# Patient Record
Sex: MALE | Race: WHITE
[De-identification: names, ages, dates, MRNs, and addresses within clinical notes are randomized per-mention and may not be internally consistent; named-entity substitution may affect disease eponyms.]

---

## 2018-05-25 ENCOUNTER — HOSPITAL ENCOUNTER (INPATIENT)
Dept: HOSPITAL 53 - M ED INP | Age: 83
LOS: 1 days | Discharge: HOME | DRG: 389 | End: 2018-05-26
Attending: INTERNAL MEDICINE | Admitting: INTERNAL MEDICINE
Payer: MEDICARE

## 2018-05-25 DIAGNOSIS — Z79.01: ICD-10-CM

## 2018-05-25 DIAGNOSIS — E87.2: ICD-10-CM

## 2018-05-25 DIAGNOSIS — E78.5: ICD-10-CM

## 2018-05-25 DIAGNOSIS — Z85.118: ICD-10-CM

## 2018-05-25 DIAGNOSIS — K56.2: Primary | ICD-10-CM

## 2018-05-25 DIAGNOSIS — I50.9: ICD-10-CM

## 2018-05-25 DIAGNOSIS — J44.9: ICD-10-CM

## 2018-05-25 DIAGNOSIS — I48.2: ICD-10-CM

## 2018-05-25 DIAGNOSIS — R91.1: ICD-10-CM

## 2018-05-25 DIAGNOSIS — D64.9: ICD-10-CM

## 2018-05-25 DIAGNOSIS — I25.10: ICD-10-CM

## 2018-05-25 DIAGNOSIS — I11.0: ICD-10-CM

## 2018-05-25 DIAGNOSIS — Z87.890: ICD-10-CM

## 2018-05-25 DIAGNOSIS — Z79.899: ICD-10-CM

## 2018-05-25 DIAGNOSIS — Z95.810: ICD-10-CM

## 2018-05-25 DIAGNOSIS — Z79.82: ICD-10-CM

## 2018-05-25 LAB
ALBUMIN/GLOBULIN RATIO: 1.08 (ref 1–1.93)
ALBUMIN: 4.2 GM/DL (ref 3.2–5.2)
ALKALINE PHOSPHATASE: 139 U/L (ref 45–117)
ALT SERPL W P-5'-P-CCNC: 32 U/L (ref 12–78)
ANION GAP: 11 MEQ/L (ref 8–16)
AST SERPL-CCNC: 27 U/L (ref 7–37)
BASO #: 0 10^3/UL (ref 0–0.2)
BASO %: 0.3 % (ref 0–1)
BILIRUB CONJ SERPL-MCNC: 0.5 MG/DL (ref 0–0.2)
BILIRUBIN,TOTAL: 1.3 MG/DL (ref 0.2–1)
BLOOD UREA NITROGEN: 26 MG/DL (ref 7–18)
CALCIUM LEVEL: 9.4 MG/DL (ref 8.8–10.2)
CARBON DIOXIDE LEVEL: 25 MEQ/L (ref 21–32)
CHLORIDE LEVEL: 103 MEQ/L (ref 98–107)
CK MB CFR.DF SERPL CALC: 3.8
CK SERPL-CCNC: 50 U/L (ref 39–308)
CK-MB VALUE MASS: 1.9 NG/ML (ref ?–3.6)
CREATININE FOR GFR: 0.97 MG/DL (ref 0.7–1.3)
EOS #: 0 10^3/UL (ref 0–0.5)
EOSINOPHIL NFR BLD AUTO: 0.3 % (ref 0–3)
FERRITIN: 289 NG/ML (ref 26–388)
FREE T4: 1.27 NG/DL (ref 0.76–1.46)
GFR SERPL CREATININE-BSD FRML MDRD: > 60 ML/MIN/{1.73_M2} (ref 35–?)
GLUCOSE, FASTING: 145 MG/DL (ref 70–100)
HEMATOCRIT: 37.4 % (ref 42–52)
HEMOGLOBIN: 12.5 G/DL (ref 13.5–17.5)
IMMATURE GRANULOCYTE %: 0.5 % (ref 0–3)
INR: 1.53
IRON (FE): 94 UG/DL (ref 65–175)
IRON SATN MFR SERPL: 33.3 % (ref 19.7–50)
LACTIC ACID SEPSIS PROTOCOL: 2.3 MMOL/L (ref 0.4–2)
LIPASE: 103 U/L (ref 73–393)
LYMPH #: 1.3 10^3/UL (ref 1.5–4.5)
LYMPH %: 14.4 % (ref 24–44)
MAGNESIUM LEVEL: 1.9 MG/DL (ref 1.8–2.4)
MEAN CORPUSCULAR HEMOGLOBIN: 34.2 PG (ref 27–33)
MEAN CORPUSCULAR HGB CONC: 33.4 G/DL (ref 32–36.5)
MEAN CORPUSCULAR VOLUME: 102.5 FL (ref 80–96)
MONO #: 0.7 10^3/UL (ref 0–0.8)
MONO %: 7.7 % (ref 0–5)
NEUTROPHILS #: 6.8 10^3/UL (ref 1.8–7.7)
NEUTROPHILS %: 76.8 % (ref 36–66)
NRBC BLD AUTO-RTO: 0 % (ref 0–0)
NT-PRO BNP: 5837 PG/ML (ref ?–450)
PARTIAL THROMBOPLASTIN TIME: 34.8 SECONDS (ref 26.8–37.9)
PLATELET COUNT, AUTOMATED: 218 10^3/UL (ref 150–450)
POTASSIUM SERUM: 4 MEQ/L (ref 3.5–5.1)
PROTHROMBIN TIME: 18.8 SECONDS (ref 12.4–14.5)
RED BLOOD COUNT: 3.65 10^6/UL (ref 4.3–6.1)
RED CELL DISTRIBUTION WIDTH: 13.1 % (ref 11.5–14.5)
SODIUM LEVEL: 139 MEQ/L (ref 136–145)
THYROID STIMULATING HORMONE: 6.14 UIU/ML (ref 0.36–3.74)
TOTAL IRON BINDING CAPACITY: 282 UG/DL (ref 250–450)
TOTAL PROTEIN: 8.1 GM/DL (ref 6.4–8.2)
TROPONIN I: 0.02 NG/ML (ref ?–0.1)
WHITE BLOOD COUNT: 8.9 10^3/UL (ref 4–10)

## 2018-05-25 RX ADMIN — MORPHINE SULFATE 1 MG: 2 INJECTION, SOLUTION INTRAMUSCULAR; INTRAVENOUS at 21:32

## 2018-05-25 RX ADMIN — ONDANSETRON 1 MG: 2 INJECTION INTRAMUSCULAR; INTRAVENOUS at 21:31

## 2018-05-26 LAB
ALBUMIN/GLOBULIN RATIO: 0.95 (ref 1–1.93)
ALBUMIN: 3.5 GM/DL (ref 3.2–5.2)
ALKALINE PHOSPHATASE: 117 U/L (ref 45–117)
ALT SERPL W P-5'-P-CCNC: 26 U/L (ref 12–78)
ANION GAP: 10 MEQ/L (ref 8–16)
AST SERPL-CCNC: 21 U/L (ref 7–37)
BASO #: 0 10^3/UL (ref 0–0.2)
BASO %: 0.4 % (ref 0–1)
BILIRUBIN,TOTAL: 0.9 MG/DL (ref 0.2–1)
BLOOD UREA NITROGEN: 27 MG/DL (ref 7–18)
CALCIUM LEVEL: 8.9 MG/DL (ref 8.8–10.2)
CARBON DIOXIDE LEVEL: 27 MEQ/L (ref 21–32)
CHLORIDE LEVEL: 106 MEQ/L (ref 98–107)
CREATININE FOR GFR: 0.9 MG/DL (ref 0.7–1.3)
EOS #: 0 10^3/UL (ref 0–0.5)
EOSINOPHIL NFR BLD AUTO: 0.2 % (ref 0–3)
GFR SERPL CREATININE-BSD FRML MDRD: > 60 ML/MIN/{1.73_M2} (ref 35–?)
GLUCOSE, FASTING: 92 MG/DL (ref 70–100)
HEMATOCRIT: 32.7 % (ref 42–52)
HEMOGLOBIN: 10.9 G/DL (ref 13.5–17.5)
IMMATURE GRANULOCYTE %: 0.4 % (ref 0–3)
LACTIC ACID SEPSIS PROTOCOL: 1.2 MMOL/L (ref 0.4–2)
LYMPH #: 0.9 10^3/UL (ref 1.5–4.5)
LYMPH %: 15 % (ref 24–44)
MEAN CORPUSCULAR HEMOGLOBIN: 34.3 PG (ref 27–33)
MEAN CORPUSCULAR HGB CONC: 33.3 G/DL (ref 32–36.5)
MEAN CORPUSCULAR VOLUME: 102.8 FL (ref 80–96)
MONO #: 0.8 10^3/UL (ref 0–0.8)
MONO %: 13.6 % (ref 0–5)
NEUTROPHILS #: 4 10^3/UL (ref 1.8–7.7)
NEUTROPHILS %: 70.4 % (ref 36–66)
NRBC BLD AUTO-RTO: 0 % (ref 0–0)
PLATELET COUNT, AUTOMATED: 162 10^3/UL (ref 150–450)
POTASSIUM SERUM: 4 MEQ/L (ref 3.5–5.1)
RED BLOOD COUNT: 3.18 10^6/UL (ref 4.3–6.1)
RED CELL DISTRIBUTION WIDTH: 13 % (ref 11.5–14.5)
SODIUM LEVEL: 143 MEQ/L (ref 136–145)
TOTAL PROTEIN: 7.2 GM/DL (ref 6.4–8.2)
TROPONIN I: 0.03 NG/ML (ref ?–0.1)
TROPONIN I: 0.04 NG/ML (ref ?–0.1)
WHITE BLOOD COUNT: 5.7 10^3/UL (ref 4–10)

## 2018-05-26 RX ADMIN — DEXTROSE MONOHYDRATE 1 MG: 50 INJECTION, SOLUTION INTRAVENOUS at 06:43

## 2018-05-26 RX ADMIN — EZETIMIBE 1 MG: 10 TABLET ORAL at 09:42

## 2018-05-26 RX ADMIN — DOCUSATE SODIUM 1 MG: 100 CAPSULE, LIQUID FILLED ORAL at 09:41

## 2018-05-26 RX ADMIN — SIMETHICONE 1 MG: 80 TABLET, CHEWABLE ORAL at 09:42

## 2018-05-26 RX ADMIN — APIXABAN 1 MG: 2.5 TABLET, FILM COATED ORAL at 09:42

## 2018-05-26 RX ADMIN — IPRATROPIUM BROMIDE AND ALBUTEROL SULFATE 1 ML: .5; 3 SOLUTION RESPIRATORY (INHALATION) at 02:00

## 2018-05-26 RX ADMIN — ASPIRIN 1 MG: 81 TABLET ORAL at 09:42

## 2018-05-26 RX ADMIN — RAMIPRIL 1 MG: 5 CAPSULE ORAL at 12:52

## 2018-05-26 RX ADMIN — IPRATROPIUM BROMIDE AND ALBUTEROL SULFATE 1 ML: .5; 3 SOLUTION RESPIRATORY (INHALATION) at 08:13

## 2018-05-28 LAB
FOLATE SERPL-MCNC: 16 NG/ML (ref 5.4–?)
VITAMIN B12 LEVEL: 230 PG/ML (ref 247–911)

## 2020-09-06 ENCOUNTER — HOSPITAL ENCOUNTER (INPATIENT)
Dept: HOSPITAL 53 - M ED | Age: 85
LOS: 2 days | Discharge: HOME | DRG: 193 | End: 2020-09-08
Attending: INTERNAL MEDICINE | Admitting: INTERNAL MEDICINE
Payer: MEDICARE

## 2020-09-06 VITALS — SYSTOLIC BLOOD PRESSURE: 120 MMHG | DIASTOLIC BLOOD PRESSURE: 57 MMHG

## 2020-09-06 VITALS — SYSTOLIC BLOOD PRESSURE: 137 MMHG | DIASTOLIC BLOOD PRESSURE: 69 MMHG

## 2020-09-06 VITALS — SYSTOLIC BLOOD PRESSURE: 102 MMHG | DIASTOLIC BLOOD PRESSURE: 63 MMHG

## 2020-09-06 VITALS — OXYGEN SATURATION: 96 %

## 2020-09-06 VITALS — BODY MASS INDEX: 17.19 KG/M2 | HEIGHT: 71 IN | WEIGHT: 122.8 LBS

## 2020-09-06 VITALS — SYSTOLIC BLOOD PRESSURE: 115 MMHG | DIASTOLIC BLOOD PRESSURE: 70 MMHG

## 2020-09-06 VITALS — SYSTOLIC BLOOD PRESSURE: 132 MMHG | DIASTOLIC BLOOD PRESSURE: 70 MMHG

## 2020-09-06 VITALS — DIASTOLIC BLOOD PRESSURE: 72 MMHG | SYSTOLIC BLOOD PRESSURE: 119 MMHG

## 2020-09-06 DIAGNOSIS — Z95.2: ICD-10-CM

## 2020-09-06 DIAGNOSIS — Z85.118: ICD-10-CM

## 2020-09-06 DIAGNOSIS — Z79.82: ICD-10-CM

## 2020-09-06 DIAGNOSIS — I50.22: ICD-10-CM

## 2020-09-06 DIAGNOSIS — J96.01: ICD-10-CM

## 2020-09-06 DIAGNOSIS — I48.20: ICD-10-CM

## 2020-09-06 DIAGNOSIS — I11.0: ICD-10-CM

## 2020-09-06 DIAGNOSIS — Z79.01: ICD-10-CM

## 2020-09-06 DIAGNOSIS — Z79.899: ICD-10-CM

## 2020-09-06 DIAGNOSIS — J18.9: Primary | ICD-10-CM

## 2020-09-06 DIAGNOSIS — I25.10: ICD-10-CM

## 2020-09-06 DIAGNOSIS — J44.9: ICD-10-CM

## 2020-09-06 LAB
ALBUMIN SERPL BCG-MCNC: 4.4 GM/DL (ref 3.2–5.2)
ALT SERPL W P-5'-P-CCNC: 57 U/L (ref 12–78)
BASE EXCESS BLDA CALC-SCNC: -4.8 MMOL/L (ref -2–2)
BASOPHILS # BLD AUTO: 0 10^3/UL (ref 0–0.2)
BASOPHILS NFR BLD AUTO: 0.3 % (ref 0–1)
BILIRUB CONJ SERPL-MCNC: 0.4 MG/DL (ref 0–0.2)
BILIRUB SERPL-MCNC: 1 MG/DL (ref 0.2–1)
BUN SERPL-MCNC: 27 MG/DL (ref 7–18)
CALCIUM SERPL-MCNC: 9.3 MG/DL (ref 8.8–10.2)
CHLORIDE SERPL-SCNC: 105 MEQ/L (ref 98–107)
CK MB CFR.DF SERPL CALC: 4.09
CK MB SERPL-MCNC: 2.7 NG/ML (ref ?–3.6)
CK SERPL-CCNC: 66 U/L (ref 39–308)
CO2 BLDA CALC-SCNC: 19.8 MEQ/L (ref 23–31)
CO2 SERPL-SCNC: 24 MEQ/L (ref 21–32)
CREAT SERPL-MCNC: 1.07 MG/DL (ref 0.7–1.3)
EOSINOPHIL # BLD AUTO: 0.2 10^3/UL (ref 0–0.5)
EOSINOPHIL NFR BLD AUTO: 1.6 % (ref 0–3)
GFR SERPL CREATININE-BSD FRML MDRD: > 60 ML/MIN/{1.73_M2} (ref 35–?)
GLUCOSE SERPL-MCNC: 105 MG/DL (ref 70–100)
HCO3 BLDA-SCNC: 18.8 MEQ/L (ref 22–26)
HCO3 STD BLDA-SCNC: 20.5 MEQ/L (ref 22–26)
HCT VFR BLD AUTO: 44.1 % (ref 42–52)
HGB BLD-MCNC: 14.2 G/DL (ref 13.5–17.5)
LYMPHOCYTES # BLD AUTO: 4.2 10^3/UL (ref 1.5–5)
LYMPHOCYTES NFR BLD AUTO: 42.6 % (ref 24–44)
MCH RBC QN AUTO: 33.5 PG (ref 27–33)
MCHC RBC AUTO-ENTMCNC: 32.2 G/DL (ref 32–36.5)
MCV RBC AUTO: 104 FL (ref 80–96)
MONOCYTES # BLD AUTO: 0.7 10^3/UL (ref 0–0.8)
MONOCYTES NFR BLD AUTO: 7.3 % (ref 0–5)
NEUTROPHILS # BLD AUTO: 4.7 10^3/UL (ref 1.5–8.5)
NEUTROPHILS NFR BLD AUTO: 48 % (ref 36–66)
NT-PRO BNP: 4429 PG/ML (ref ?–450)
PCO2 BLDA: 30.8 MMHG (ref 35–45)
PH BLDA: 7.4 UNITS (ref 7.35–7.45)
PLATELET # BLD AUTO: 214 10^3/UL (ref 150–450)
PO2 BLDA: 89 MMHG (ref 75–100)
POTASSIUM SERPL-SCNC: 4.7 MEQ/L (ref 3.5–5.1)
PROT SERPL-MCNC: 8.5 GM/DL (ref 6.4–8.2)
RBC # BLD AUTO: 4.24 10^6/UL (ref 4.3–6.1)
SAO2 % BLDA: 96.5 % (ref 95–99)
SODIUM SERPL-SCNC: 137 MEQ/L (ref 136–145)
T4 SERPL-MCNC: 8.7 UG/DL (ref 4.5–12)
TROPONIN I SERPL-MCNC: 0.12 NG/ML (ref ?–0.1)
TSH SERPL DL<=0.005 MIU/L-ACNC: 7.99 UIU/ML (ref 0.36–3.74)
WBC # BLD AUTO: 9.7 10^3/UL (ref 4–10)

## 2020-09-06 RX ADMIN — APIXABAN SCH MG: 2.5 TABLET, FILM COATED ORAL at 22:26

## 2020-09-06 RX ADMIN — IPRATROPIUM BROMIDE AND ALBUTEROL SULFATE SCH ML: .5; 3 SOLUTION RESPIRATORY (INHALATION) at 12:00

## 2020-09-06 RX ADMIN — ATORVASTATIN CALCIUM SCH MG: 20 TABLET, FILM COATED ORAL at 22:26

## 2020-09-06 RX ADMIN — DEXTROSE MONOHYDRATE SCH MG: 50 INJECTION, SOLUTION INTRAVENOUS at 12:31

## 2020-09-06 RX ADMIN — IPRATROPIUM BROMIDE AND ALBUTEROL SULFATE SCH ML: .5; 3 SOLUTION RESPIRATORY (INHALATION) at 16:13

## 2020-09-06 RX ADMIN — IPRATROPIUM BROMIDE AND ALBUTEROL SULFATE SCH ML: .5; 3 SOLUTION RESPIRATORY (INHALATION) at 19:34

## 2020-09-06 NOTE — HPEPDOC
[facility] Medical History & Physical


Date of Admission


Sep 6, 2020


Date of Service:  Sep 6, 2020


Attending Physician:  DRE WINSTON MD





History and Physical


CHIEF COMPLAINT: Worsening shortness of breath and sputum





HISTORY OF PRESENT ILLNESS: 86 yo M, resident of [place] who summers in the

[facility], with HFrEF, CAD s/p PCI and AICD, COPD, L lung cancer s/p who left lung 

resection, Afib on eliquis, chronic sputum production for which he takes 

guaifenesin 600 BID who developed worsening of his baseline sputum despite 

taking his medications with difficulty clearing it with gurgling and had 

worsening SOB over a few days until it was severe enough that his wife called 

EMS that found him tachypneic with a room air saturation of 80% and he was 

brought to the ED. 





In the ED, he arrived HDS, afebrile and was initially placed on nasal canula and

given 3 duonebs, solumedrol 125mg IV. His hypoxemia unfortunately worsened and 

he was placed on BiPAP 15/5 at 30% with an initial ABG /218/52.6/85 that 

improved to 7.371/36.4/145 with improvement in work of breathing. Workup showed 

WBC 9.7, hgb 14.2, platelets 214, na 139, K 4.7, Cr 1, lactate 3.4, TSH 7.99, 

free t4 8.7, troponin 0.12, proBNP 4429, AST 59, ALT 57, while CXR showed course

interstitial changes throughout the right lung, focal infiltrates in RUL, while 

acknowledging a history of the same presentation in 2018, as well as distended 

air filled loops of bowel in upper abdomen. He was admitted to the ICU for 

hypoxemic respiratory failure 2/2 CAP and some element of CHF.





PAST MEDICAL HISTORY:


HFrEF, CAD s/p PCI and AICD, COPD, L lung cancer s/p who left lung resection, 

Afib on eliquis, chronic sputum production for which he takes guaifenesin 600 

BID





PAST SURGICAL HISTORY:


Left lung resection





SOCIAL HISTORY:


. Resides in [place] most of the year and in [facility] for 

the summer.





FAMILY HISTORY:


Non contributory





ALLERGIES: None





REVIEW OF SYSTEMS:


10 point ROS was reviewed and all pertinent positives were as discussed above in

the HPI and all else were otherwise negative. He particularly denied chest pain,

palpitations, presyncope, hemoptysis, dysphagia history, fever, chills, nausea 

and vomiting.





HOME MEDICATIONS: Please see below. 





PHYSICAL EXAMINATION:


VITAL SIGNS: HDS, afebrile, on BiPAP


GENERAL APPEARANCE: Elderly, thin, NAD


HEENT: NCAT, PERRLA, EOMI, MMM


CARDIOVASCULAR: Irregularly irregular, 3/6 holosystolic murmur throughout 

precordium, loudest at RUSB


LUNGS: No sounds on left, right with transmitted upper airway sounds and some 

midlung crackles, no wheezing


ABDOMEN: soft, normoactive sounds, NTND


MUSCULOSKELETAL: moving al 4 extremities with full strength and ROM


EXTREMITIES: WWP, no LE edema


NEUROLOGICAL: CN 2-12 intact, moving all extremities


PSYCHIATRIC: AOx3





LABORATORY DATA: See below.





IMAGING: as described above





MICROBIOLOGY: Please see below. 





ASSESSMENT: 86 yo M, resident of [place] who summers in the [facility], with 

HFrEF, CAD s/p PCI and AICD, COPD, L lung cancer s/p who left lung resection, 

Afib on eliquis, chronic sputum production for which he takes guaifenesin 600 

BID who developed worsening of his baseline sputum despite taking his 

medications with difficulty clearing it with gurgling and had worsening SOB over

a few days until it was severe enough that his wife called EMS that found him 

tachypneic with a room air saturation of 80% and now admitted for hypoxemic 

respiratory failure 2/2 presumed CAP, possible mucus plug and CHF exacerbation.





PLAN:


1. Hypoxemic respiratory failure 2/2 presumed CAP, possible mucus plug and CHF 

exacerbation.





a. CAP:


-start empiric ceftriaxone/doxycycline


-sputum culture


-respiratory swab was negative, covid-19 negative


-urine strep and legionella


-procalcitonin, given the similarity of opacity to prior imaging


-incentive spirometry


-duonebs q4H


-levalbuterol q6HP


-mucinex 1200mg BID





b. Acute on chronic HFrEF


-lasix 40 IV once for now


-has AICD


-hold ACEi for now with soft BPs while attempting gentle diuresis


-for now, will plan on continuing 3.125 BID coreg but may have to hold if BP 

drops because priority is to diurese





COPD: does not appear to be having an exacerbation but given acuity of prese

ntation received steroids and nebs


-hold off on more steroids at this time


-duonebs q4h


-xopenex q6h PRN


-mucinex 1200mg BID


-incentive spirometry


-now on 2L NC, continue supplemental O2 with wean to goal >88%





chronic CAD


-continue lipitor and ezetimibe


-ASA 81mg QD








chronic Afib


-continue eliquis at 2.5mg BID


-coreg 3.125 BID





HTN:


-holding ACEi


-will tentatively plan to continue coreg if BP allows





history of lung CA: stable


-s/p L lung removal surgery





DVT ppx: apixaban


Dispo: medsurg from ICU now that he is on 2L NC. PT/OT with ambulatory sat 

tomorrow





Vital Signs





Vital Signs








  Date Time  Temp Pulse Resp B/P (MAP) Pulse Ox O2 Delivery O2 Flow Rate FiO2


 


9/6/20 16:14  126      


 


9/6/20 14:43 98.5  21 132/70 (90) 98 Nasal Cannula 2.0 


 


9/6/20 12:32        30











Laboratory Data


Labs 24H


Laboratory Tests 2


9/6/20 10:15: 


Immature Granulocyte % (Auto) 0.2, Neutrophils (%) (Auto) 48.0, Lymphocytes (%) 

(Auto) 42.6, Monocytes (%) (Auto) 7.3H, Eosinophils (%) (Auto) 1.6, Basophils 

(%) (Auto) 0.3, Neutrophils # (Auto) 4.7, Lymphocytes # (Auto) 4.2, Monocytes # 

(Auto) 0.7, Eosinophils # (Auto) 0.2, Basophils # (Auto) 0.0, Nucleated Red 

Blood Cells % (auto) 0.0, Anion Gap 8, Glomerular Filtration Rate > 60.0, Lactic

Acid Level 3.4*H, Calcium Level 9.3, Total Bilirubin 1.0, Direct Bilirubin 0.4H,

Aspartate Amino Transf (AST/SGOT) 59H, Alanine Aminotransferase (ALT/SGPT) 57, 

Alkaline Phosphatase 166H, Total Creatine Kinase 66, Creatine Kinase MB 2.7, 

Creatine Kinase MB Relative Index 4.09H, Troponin I 0.12H, NT-Pro-B-Type 

Natriuretic Peptide 4429H, Total Protein 8.5H, Albumin 4.4, Albumin/Globulin 

Ratio 1.1, Thyroid Stimulating Hormone (TSH) 7.990H, Thyroxine (T4) 8.7


9/6/20 15:15: 


Blood Gas Bicarbonate Standard 20.5L, Arterial Blood pH 7.404, Arterial Blood 

Partial Pressure CO2 30.8L, Arterial Blood Partial Pressure O2 89.0, Arterial 

Blood Total CO2 19.8L, Arterial Blood HCO3 18.8L, Arterial Blood Base Excess -

4.8L, Arterial Blood Oxygen Saturation 96.5, Lactic Acid Followup at 4 Hours 

2.7*H


9/6/20 15:25: 


CBC/BMP


Laboratory Tests


9/6/20 10:15








Microbiology





Microbiology


9/6/20 Gram Stain, Received


         Pending


9/6/20 Sputum Culture, Received


         Pending


9/6/20 Blood Culture, Received


         Pending


9/6/20 Respiratory Virus Panel (PCR) ([facility]) - Final, Complete


         


9/6/20 Blood Culture, Received


         Pending





Home Medications


Scheduled


Apixaban (Eliquis) 2.5 Mg Tab, 2.5 MG PO BID


Aspirin (Aspirin EC) 81 Mg Tab, 81 MG PO DAILY


Atorvastatin Calcium (Atorvastatin Calcium) 80 Mg Tab, 80 MG PO QHS


Carvedilol (Carvedilol) 3.125 Mg Tab, 3.125 MG PO BID


Ezetimibe (Zetia) 10 Mg Tab, 10 MG PO DAILY


Furosemide (Furosemide) 20 Mg Tablet, 10 MG PO DAILY


   @ NOON 


Guaifenesin (Mucinex) 600 Mg Tab.er.12h, 600 MG PO BID


Ramipril (Ramipril) 2.5 Mg Cap, 2.5 MG PO DAILY


   USUALLY TAKES MIDDAY 





Scheduled PRN


Albuterol Sulfate (Ventolin Hfa) 18 Gm Hfa.aer.ad, 2 PUFFS INH Q4H PRN for 

SHORTNESS OF BREATH


Docusate Sodium (Colace) 100 Mg Capsule, 100 MG PO BID PRN for CONSTIPATION


Fluticasone Propionate (Flonase Allergy Relief) 50 Mcg/Act Spr, 1 SPRAY NA DAILY

PRN for CONGESTION





Allergies


Coded Allergies:  


     No Known Allergies (Unverified , 9/6/20)





A-FIB/CHADSVASC


A-FIB History


Current/History of A-Fib/PAF?:  Yes


Current PO Anticoag Therapy:  Yes











DRE WINSTON MD    Sep 6, 2020 17:41

## 2020-09-06 NOTE — REPVR
PROCEDURE INFORMATION: 

Exam: XR Chest, 1 View 

Exam date and time: 9/6/2020 10:49 AM 

Age: 85 years old 

Clinical indication: Other: Cough; Additional info: Dyspnea/cough 



TECHNIQUE: 

Imaging protocol: XR of the chest 

Views: 1 view. 



COMPARISON: 

CR Chest, 2 view PA, Lat 5/25/2018 8:42 PM 



FINDINGS: 

Tubes, catheters and devices: There is a left-sided pacemaker. There are 

several EKG leads overlying the chest. 

Lungs: There is opacification of the left hemithorax, similar to prior study. 

There are coarse interstitial changes throughout the right lung. There is a 

focal infiltrate in the right upper lobe bordering the minor fissure, less 

pronounced than prior study from 2018. Although this is likely chronic, an 

acute infiltrate/pneumonia in the same location is a possibility. 

Pleural space: There is extensive pleural calcification. 

Heart/Mediastinum: There is cardiomegaly. There is tracheal deviation toward 

the left. This may partly be positional. 

Bones/joints: Median sternotomy wires are present. 



Intraperitoneal space: There are several moderately distended air-filled loops 

of bowel in the upper abdomen. A portion of this may represent the stomach. 

This is not fully characterized on this exam. 



IMPRESSION: 

1. There are coarse interstitial changes throughout the right lung. There is a 

focal infiltrate in the right upper lobe bordering the minor fissure, less 

pronounced than prior study from 2018. Although this is likely chronic, an 

acute infiltrate/pneumonia in the same location is a possibility. Followup 

imaging is recommended. 

2. There are several moderately distended air-filled loops of bowel in the 

upper abdomen. A portion of this may represent the stomach. This is not fully 

characterized on this exam. If abdominal pathology is suspected, imaging of the 

abdomen would be recommended. 

3. There is tracheal deviation toward the left. This may partly be positional. 

Followup imaging is recommended. 



Electronically signed by: Manpreet Alas On 09/06/2020  11:16:17 AM

## 2020-09-07 VITALS — DIASTOLIC BLOOD PRESSURE: 52 MMHG | SYSTOLIC BLOOD PRESSURE: 91 MMHG

## 2020-09-07 VITALS — SYSTOLIC BLOOD PRESSURE: 111 MMHG | DIASTOLIC BLOOD PRESSURE: 64 MMHG

## 2020-09-07 VITALS — DIASTOLIC BLOOD PRESSURE: 80 MMHG | SYSTOLIC BLOOD PRESSURE: 137 MMHG

## 2020-09-07 VITALS — DIASTOLIC BLOOD PRESSURE: 50 MMHG | SYSTOLIC BLOOD PRESSURE: 98 MMHG

## 2020-09-07 LAB
ALBUMIN SERPL BCG-MCNC: 3.3 GM/DL (ref 3.2–5.2)
ALT SERPL W P-5'-P-CCNC: 49 U/L (ref 12–78)
BILIRUB SERPL-MCNC: 0.7 MG/DL (ref 0.2–1)
BUN SERPL-MCNC: 39 MG/DL (ref 7–18)
CALCIUM SERPL-MCNC: 8.7 MG/DL (ref 8.8–10.2)
CHLORIDE SERPL-SCNC: 105 MEQ/L (ref 98–107)
CO2 SERPL-SCNC: 23 MEQ/L (ref 21–32)
CREAT SERPL-MCNC: 1.23 MG/DL (ref 0.7–1.3)
GFR SERPL CREATININE-BSD FRML MDRD: 59.8 ML/MIN/{1.73_M2} (ref 35–?)
GLUCOSE SERPL-MCNC: 156 MG/DL (ref 70–100)
HCT VFR BLD AUTO: 34.6 % (ref 42–52)
HGB BLD-MCNC: 11.3 G/DL (ref 13.5–17.5)
MAGNESIUM SERPL-MCNC: 1.8 MG/DL (ref 1.8–2.4)
MCH RBC QN AUTO: 33.3 PG (ref 27–33)
MCHC RBC AUTO-ENTMCNC: 32.7 G/DL (ref 32–36.5)
MCV RBC AUTO: 102.1 FL (ref 80–96)
PLATELET # BLD AUTO: 143 10^3/UL (ref 150–450)
POTASSIUM SERPL-SCNC: 4.3 MEQ/L (ref 3.5–5.1)
PROT SERPL-MCNC: 6.7 GM/DL (ref 6.4–8.2)
RBC # BLD AUTO: 3.39 10^6/UL (ref 4.3–6.1)
SODIUM SERPL-SCNC: 136 MEQ/L (ref 136–145)
WBC # BLD AUTO: 7.9 10^3/UL (ref 4–10)

## 2020-09-07 RX ADMIN — IPRATROPIUM BROMIDE AND ALBUTEROL SULFATE SCH ML: .5; 3 SOLUTION RESPIRATORY (INHALATION) at 04:25

## 2020-09-07 RX ADMIN — DEXTROSE MONOHYDRATE SCH MG: 50 INJECTION, SOLUTION INTRAVENOUS at 08:58

## 2020-09-07 RX ADMIN — EZETIMIBE SCH MG: 10 TABLET ORAL at 08:59

## 2020-09-07 RX ADMIN — APIXABAN SCH MG: 2.5 TABLET, FILM COATED ORAL at 21:46

## 2020-09-07 RX ADMIN — IPRATROPIUM BROMIDE AND ALBUTEROL SULFATE SCH ML: .5; 3 SOLUTION RESPIRATORY (INHALATION) at 00:18

## 2020-09-07 RX ADMIN — CEFUROXIME AXETIL SCH MG: 500 TABLET ORAL at 12:43

## 2020-09-07 RX ADMIN — ATORVASTATIN CALCIUM SCH MG: 20 TABLET, FILM COATED ORAL at 21:45

## 2020-09-07 RX ADMIN — ASPIRIN SCH MG: 81 TABLET ORAL at 09:00

## 2020-09-07 RX ADMIN — CEFUROXIME AXETIL SCH MG: 500 TABLET ORAL at 21:46

## 2020-09-07 RX ADMIN — IPRATROPIUM BROMIDE AND ALBUTEROL SULFATE SCH ML: .5; 3 SOLUTION RESPIRATORY (INHALATION) at 17:57

## 2020-09-07 RX ADMIN — FUROSEMIDE SCH MG: 20 TABLET ORAL at 12:44

## 2020-09-07 RX ADMIN — IPRATROPIUM BROMIDE AND ALBUTEROL SULFATE SCH ML: .5; 3 SOLUTION RESPIRATORY (INHALATION) at 12:00

## 2020-09-07 RX ADMIN — IPRATROPIUM BROMIDE AND ALBUTEROL SULFATE SCH ML: .5; 3 SOLUTION RESPIRATORY (INHALATION) at 15:36

## 2020-09-07 RX ADMIN — APIXABAN SCH MG: 2.5 TABLET, FILM COATED ORAL at 09:00

## 2020-09-07 RX ADMIN — IPRATROPIUM BROMIDE AND ALBUTEROL SULFATE SCH ML: .5; 3 SOLUTION RESPIRATORY (INHALATION) at 07:41

## 2020-09-07 RX ADMIN — DOXYCYCLINE HYCLATE SCH MG: 100 TABLET, COATED ORAL at 09:04

## 2020-09-07 RX ADMIN — DOXYCYCLINE HYCLATE SCH MG: 100 TABLET, COATED ORAL at 21:46

## 2020-09-07 NOTE — IPNPDOC
Text Note


Date of Service


The patient was seen on 9/7/20.





NOTE


SUBJECTIVE:


-Doing well, now on room air





PHYSICAL EXAMINATION:


VITAL SIGNS: HDS, afebrile, on BiPAP


GENERAL APPEARANCE: Elderly, thin, NAD


HEENT: NCAT, PERRLA, EOMI, MMM


CARDIOVASCULAR: Irregularly irregular, 3/6 holosystolic murmur throughout 

precordium, loudest at RUSB


LUNGS: No sounds on left, right much clearer than yesterday, no gurgling this 

morning, no wheezing


ABDOMEN: soft, normoactive sounds, NTND


MUSCULOSKELETAL: moving all 4 extremities with full strength and ROM


EXTREMITIES: WWP, no LE edema


NEUROLOGICAL: CN 2-12 intact, moving all extremities


PSYCHIATRIC: AOx3





LABORATORY DATA: Reviewed.


WBC 7.9


hgb 11.3


platelets 143


na 136


K 4.3


Cr 1.23


MRSA negative





IMAGING: as described above





MICROBIOLOGY: Please see below. 





ASSESSMENT: 86 yo M, resident of [place] who summers in the [facility], with 

HFrEF, CAD s/p PCI and AICD, COPD, L lung cancer s/p who left lung resection, 

Afib on eliquis, chronic sputum production for which he takes guaifenesin 600 

BID who developed worsening of his baseline sputum despite taking his 

medications with difficulty clearing it with gurgling and had worsening SOB over

a few days until it was severe enough that his wife called EMS that found him 

tachypneic with a room air saturation of 80% and now admitted for hypoxemic re

spiratory failure 2/2 presumed CAP, possible mucus plug and CHF exacerbation.





PLAN:


1. Hypoxemic respiratory failure 2/2 presumed CAP, possible mucus plug and CHF 

exacerbation.





a. CAP:


-switch empiric ceftriaxone/doxycycline to ceftin/doxy PO


-sputum culture pending


-respiratory swab was negative, covid-19 negative


-urine strep and legionella pending


-procalcitonin pending, given the similarity of opacity to prior imaging


-incentive spirometry


-duonebs q4H


-levalbuterol q6HP


-mucinex 1200mg BID





b. Acute on chronic HFrEF


-restart home lasix dosing PO


-has AICD


-continue holding ACEi 


-continue 3.125 BID coreg 





COPD: does not appear to be having an exacerbation but given acuity of presenta

tion received steroids and nebs


-hold off on more steroids, doing well


-duonebs q4h


-xopenex q6h PRN


-mucinex 1200mg BID


-incentive spirometry


-now on room air





chronic CAD


-continue lipitor and ezetimibe


-ASA 81mg QD








chronic Afib


-continue eliquis at 2.5mg BID


-coreg 3.125 BID





HTN:


-holding ACEi


-will tentatively plan to continue coreg if BP allows





history of lung CA: stable


-s/p L lung removal surgery





DVT ppx: apixaban


Dispo: medsurg, likely home tomorrow





VS,Fishbone, I+O


VS, Fishbone, I+O


Laboratory Tests


9/6/20 10:15








9/7/20 05:23











Vital Signs








  Date Time  Temp Pulse Resp B/P (MAP) Pulse Ox O2 Delivery O2 Flow Rate FiO2


 


9/7/20 07:07    98/50 (66)    


 


9/7/20 06:00 97.1 69 17  98 Room Air  


 


9/6/20 16:00       2.0 


 


9/6/20 12:32        30














I&O- Last 24 Hours up to 6 AM 


 


 9/7/20





 06:00


 


Intake Total 870 ml


 


Output Total 550 ml


 


Balance 320 ml

















DRE WINSTON MD    Sep 7, 2020 08:35

## 2020-09-08 VITALS — SYSTOLIC BLOOD PRESSURE: 96 MMHG | DIASTOLIC BLOOD PRESSURE: 58 MMHG

## 2020-09-08 VITALS — DIASTOLIC BLOOD PRESSURE: 78 MMHG | SYSTOLIC BLOOD PRESSURE: 130 MMHG

## 2020-09-08 LAB
ALBUMIN SERPL BCG-MCNC: 3.5 GM/DL (ref 3.2–5.2)
ALT SERPL W P-5'-P-CCNC: 43 U/L (ref 12–78)
BILIRUB SERPL-MCNC: 0.7 MG/DL (ref 0.2–1)
BUN SERPL-MCNC: 42 MG/DL (ref 7–18)
CALCIUM SERPL-MCNC: 9.1 MG/DL (ref 8.8–10.2)
CHLORIDE SERPL-SCNC: 107 MEQ/L (ref 98–107)
CO2 SERPL-SCNC: 25 MEQ/L (ref 21–32)
CREAT SERPL-MCNC: 1.06 MG/DL (ref 0.7–1.3)
GFR SERPL CREATININE-BSD FRML MDRD: > 60 ML/MIN/{1.73_M2} (ref 35–?)
GLUCOSE SERPL-MCNC: 92 MG/DL (ref 70–100)
HCT VFR BLD AUTO: 34.1 % (ref 42–52)
HGB BLD-MCNC: 11.2 G/DL (ref 13.5–17.5)
MAGNESIUM SERPL-MCNC: 2 MG/DL (ref 1.8–2.4)
MCH RBC QN AUTO: 33.2 PG (ref 27–33)
MCHC RBC AUTO-ENTMCNC: 32.8 G/DL (ref 32–36.5)
MCV RBC AUTO: 101.2 FL (ref 80–96)
PLATELET # BLD AUTO: 155 10^3/UL (ref 150–450)
POTASSIUM SERPL-SCNC: 3.8 MEQ/L (ref 3.5–5.1)
PROT SERPL-MCNC: 6.8 GM/DL (ref 6.4–8.2)
RBC # BLD AUTO: 3.37 10^6/UL (ref 4.3–6.1)
SODIUM SERPL-SCNC: 137 MEQ/L (ref 136–145)
WBC # BLD AUTO: 8.9 10^3/UL (ref 4–10)

## 2020-09-08 RX ADMIN — DOXYCYCLINE HYCLATE SCH MG: 100 TABLET, COATED ORAL at 10:07

## 2020-09-08 RX ADMIN — IPRATROPIUM BROMIDE AND ALBUTEROL SULFATE SCH ML: .5; 3 SOLUTION RESPIRATORY (INHALATION) at 00:00

## 2020-09-08 RX ADMIN — EZETIMIBE SCH MG: 10 TABLET ORAL at 10:07

## 2020-09-08 RX ADMIN — CEFUROXIME AXETIL SCH MG: 500 TABLET ORAL at 10:07

## 2020-09-08 RX ADMIN — FUROSEMIDE SCH MG: 20 TABLET ORAL at 10:07

## 2020-09-08 RX ADMIN — APIXABAN SCH MG: 2.5 TABLET, FILM COATED ORAL at 10:07

## 2020-09-08 RX ADMIN — ASPIRIN SCH MG: 81 TABLET ORAL at 10:07

## 2020-09-08 RX ADMIN — IPRATROPIUM BROMIDE AND ALBUTEROL SULFATE SCH ML: .5; 3 SOLUTION RESPIRATORY (INHALATION) at 10:19

## 2020-09-08 RX ADMIN — DEXTROSE MONOHYDRATE SCH MG: 50 INJECTION, SOLUTION INTRAVENOUS at 10:06

## 2020-09-08 RX ADMIN — IPRATROPIUM BROMIDE AND ALBUTEROL SULFATE SCH ML: .5; 3 SOLUTION RESPIRATORY (INHALATION) at 07:38

## 2020-09-08 NOTE — DS.PDOC
Discharge Summary


General


Date of Admission


Sep 6, 2020 at 11:59


Date of Discharge


9/8/2020


Attending Physician:  DRE WINSTON MD





Discharge Summary


PROCEDURES PERFORMED DURING STAY: None





ADMITTING DIAGNOSES: 


1. CAP


2. Hypoxemic respiratory failure 2/2 mucus plug





DISCHARGE DIAGNOSES:


1. CAP


2. Hypoxemic respiratory failure 2/2 mucus plug


3. History of lung CA s/p L lung resection


4. chronic HFrEF


5. Chronic AFib


6. chornic CAD s/p PCI and AICD


7. COPD





COMPLICATIONS/CHIEF COMPLAINT: Acute Respiratory Failure W Hypoxia Hypercapnia.





HISTORY OF PRESENT ILLNESS: 


86 yo M, resident of [place] who summers in the [facility], with HFrEF, CAD s/p 

PCI and AICD, COPD, L lung cancer s/p who left lung resection, Afib on eliquis, 

chronic sputum production for which he takes guaifenesin 600 BID who developed 

worsening of his baseline sputum despite taking his medications with difficulty 

clearing it with gurgling and had worsening SOB over a few days until it was 

severe enough that his wife called EMS that found him tachypneic with a room air

saturation of 80% and he was brought to the ED. 





HOSPITAL COURSE: 


In the ED, he arrived HDS, afebrile and was initially placed on nasal canula and

given 3 duonebs, solumedrol 125mg IV. His hypoxemia unfortunately worsened and h

e was placed on BiPAP 15/5 at 30% with an initial ABG /218/52.6/85 that improved

to 7.371/36.4/145 with improvement in work of breathing. Workup showed WBC 9.7, 

hgb 14.2, platelets 214, na 139, K 4.7, Cr 1, lactate 3.4, TSH 7.99, free t4 

8.7, troponin 0.12, proBNP 4429, AST 59, ALT 57, while CXR showed course 

interstitial changes throughout the right lung, focal infiltrates in RUL, while 

acknowledging a history of the same presentation in 2018, as well as distended 

air filled loops of bowel in upper abdomen. He was admitted to the ICU for 

hypoxemic respiratory failure 2/2 CAP and some element of CHF. He is only in the

ICU for 2 hours and his hypoxemia dramatically improved such that he was on room

air by within a few hours. He was empirically started on ceftriaxone/doxy for 

CAP and supported with Duonebs and xopenex nebs with return to baseline. In the 

meantime his guaifenesin was increased to 1200mg BID. He is now being discharged

home with a nebulizer machine with a new script for PRN duonebs q8hPRN and 3 

remaining days of ceftin/doxy to complete treatment of potential CAP.





DISCHARGE MEDICATIONS: Please see below.


 


ALLERGIES: Please see below.





PHYSICAL EXAMINATION ON DISCHARGE:


VITAL SIGNS: Please see below.


VITAL SIGNS: HDS, afebrile, on BiPAP


GENERAL APPEARANCE: Elderly, thin, NAD


HEENT: NCAT, PERRLA, EOMI, MMM


CARDIOVASCULAR: Irregularly irregular, 3/6 holosystolic murmur throughout 

precordium, loudest at RUSB


LUNGS: No sounds on left, clear to auscultation on right, no gurgling this 

morning, no wheezing


ABDOMEN: soft, normoactive sounds, NTND


MUSCULOSKELETAL: moving all 4 extremities with full strength and ROM


EXTREMITIES: WWP, no LE edema


NEUROLOGICAL: CN 2-12 intact, moving all extremities


PSYCHIATRIC: AOx3





LABORATORY DATA: Please see below.





IMAGING: 


CXR


1. There are coarse interstitial changes throughout the right lung. There is a 


focal infiltrate in the right upper lobe bordering the minor fissure, less 


pronounced than prior study from 2018. Although this is likely chronic, an 


acute infiltrate/pneumonia in the same location is a possibility. Followup 


imaging is recommended. 


2. There are several moderately distended air-filled loops of bowel in the 


upper abdomen. A portion of this may represent the stomach. This is not fully 


characterized on this exam. If abdominal pathology is suspected, imaging of the 


abdomen would be recommended. 


3. There is tracheal deviation toward the left. This may partly be positional. 


Followup imaging is recommended. 





PROGNOSIS: Good





ACTIVITY: As tolerated





DIET: 2g sodium





DISCHARGE PLAN: Home with 3 more days of ceftin/doxy and PRN duonebs with PCP 

follow up within 1 week





DISPOSITION: Home





DISCHARGE INSTRUCTIONS:


1. Home with 3 more days of ceftin/doxy and PRN duonebs with PCP follow up 

within 1 week





ITEMS TO FOLLOWUP ON ON OUTPATIENT:


1. CAP 


2. Mucus plugging events and management





DISCHARGE CONDITION: Stable





TIME SPENT ON DISCHARGE:45 minutes.





Vital Signs/I&Os





Vital Signs








  Date Time  Temp Pulse Resp B/P (MAP) Pulse Ox O2 Delivery O2 Flow Rate FiO2


 


9/8/20 06:00 97.3 77 16 130/78 (95) 97 Room Air  


 


9/6/20 16:00       2.0 


 


9/6/20 12:32        30














I&O- Last 24 Hours up to 6 AM 


 


 9/8/20





 06:00


 


Intake Total 1470 ml


 


Output Total 0 ml


 


Balance 1470 ml











Laboratory Data


Labs 24H


Laboratory Tests 2


9/8/20 05:00: 


Nucleated Red Blood Cells % (auto) 0.0, Anion Gap 5L, Glomerular Filtration Rate

> 60.0, Calcium Level 9.1, Magnesium Level 2.0, Total Bilirubin 0.7, Aspartate 

Amino Transf (AST/SGOT) 48H, Alanine Aminotransferase (ALT/SGPT) 43, Alkaline 

Phosphatase 110, Total Protein 6.8, Albumin 3.5, Albumin/Globulin Ratio 1.1


CBC/BMP


Laboratory Tests


9/8/20 05:00











Microbiology





Microbiology


9/6/20 Gram Stain - Final, Resulted


         


9/6/20 Sputum Culture, Resulted


         Pending


9/6/20 Blood Culture - Preliminary, Resulted


         No growth after 24 hours . All specim...


9/6/20 Respiratory Virus Panel (PCR) ([facility]) - Final, Complete


         


9/6/20 Blood Culture - Preliminary, Resulted


         No growth after 24 hours . All specim...





Discharge Medications


Scheduled


Apixaban (Eliquis) 2.5 Mg Tab, 2.5 MG PO BID, (Reported)


Aspirin (Aspirin EC) 81 Mg Tab, 81 MG PO DAILY, (Reported)


Atorvastatin Calcium (Atorvastatin Calcium) 80 Mg Tab, 80 MG PO QHS, (Reported)


Carvedilol (Carvedilol) 3.125 Mg Tab, 3.125 MG PO BID, (Reported)


Cefuroxime Axetil (Cefuroxime) 500 Mg Tablet, 500 MG PO BID


Doxycycline Hyclate (Doxycycline Hyclate) 100 Mg Tablet, 100 MG PO BID


Ezetimibe (Zetia) 10 Mg Tab, 10 MG PO DAILY, (Reported)


Furosemide (Furosemide) 20 Mg Tablet, 10 MG PO DAILY, (Reported)


   @ NOON 


Guaifenesin (Mucinex) 600 Mg Tab.er.12h, 1,200 MG PO BID


Ramipril (Ramipril) 2.5 Mg Cap, 2.5 MG PO DAILY, (Reported)


   USUALLY TAKES MIDDAY 





Scheduled PRN


Albuterol Sulfate (Ventolin Hfa) 18 Gm Hfa.aer.ad, 2 PUFFS INH Q4H PRN for 

SHORTNESS OF BREATH, (Reported)


Docusate Sodium (Colace) 100 Mg Capsule, 100 MG PO BID PRN for CONSTIPATION, 

(Reported)


Fluticasone Propionate (Flonase Allergy Relief) 50 Mcg/Act Spr, 1 SPRAY NA DAILY

PRN for CONGESTION, (Reported)


Ipratropium/Albuterol Sulfate (Iprat-Albut 0.5-3(2.5) mg/3 ml) 3 Ml Ampul.neb, 3

ML NEB Q8HP PRN for SOB/COUGH





Allergies


Coded Allergies:  


     No Known Allergies (Unverified , 9/6/20)











DRE WINSTON MD    Sep 8, 2020 08:41

## 2020-09-09 LAB
BACTERIA ID TEST ISLT QL CULT: (no result)
BACTERIA SPEC BFLD CULT: (no result)

## 2020-09-20 NOTE — ECGEPIP
[facility] - ED

                                       

                                       Test Date:    2020

Pat Name:     ADELFO REYES           Department:   

Patient ID:   [id number]                 Room:         -

Gender:       Male                     Technician:   

:          [id number]               Requested By: Maja Lundborg-Gray 

Order Number: [id number]     Reading MD:   Tori Tucker

                                 Measurements

Intervals                              Axis          

Rate:         107                      P:            

NV:           0                        QRS:          66

QRSD:         113                      T:            42

QT:           332                                    

QTc:          444                                    

                           Interpretive Statements

ATRIAL FIBRILLATION WITH RAPID VENTRICULAR RESPONSE

INFERIOR MYOCARDIAL INFARCTION, OF INDETERMINATE AGE

ST DEPRESSION, CONSIDER SUBENDOCARDIAL INJURY

Electronically Signed on 2020 13:20:44 EDT by Tori Tucker

## 2020-10-05 ENCOUNTER — HOSPITAL ENCOUNTER (INPATIENT)
Dept: HOSPITAL 53 - M ED | Age: 85
LOS: 3 days | Discharge: HOME | DRG: 280 | End: 2020-10-08
Attending: INTERNAL MEDICINE | Admitting: INTERNAL MEDICINE
Payer: MEDICARE

## 2020-10-05 VITALS — DIASTOLIC BLOOD PRESSURE: 58 MMHG | SYSTOLIC BLOOD PRESSURE: 131 MMHG

## 2020-10-05 VITALS — HEIGHT: 71 IN | BODY MASS INDEX: 17.87 KG/M2 | WEIGHT: 127.65 LBS

## 2020-10-05 DIAGNOSIS — I48.20: ICD-10-CM

## 2020-10-05 DIAGNOSIS — I48.0: ICD-10-CM

## 2020-10-05 DIAGNOSIS — I34.0: ICD-10-CM

## 2020-10-05 DIAGNOSIS — I25.10: ICD-10-CM

## 2020-10-05 DIAGNOSIS — I50.33: ICD-10-CM

## 2020-10-05 DIAGNOSIS — Z85.118: ICD-10-CM

## 2020-10-05 DIAGNOSIS — Z87.891: ICD-10-CM

## 2020-10-05 DIAGNOSIS — E46: ICD-10-CM

## 2020-10-05 DIAGNOSIS — Z79.01: ICD-10-CM

## 2020-10-05 DIAGNOSIS — Z79.899: ICD-10-CM

## 2020-10-05 DIAGNOSIS — D53.9: ICD-10-CM

## 2020-10-05 DIAGNOSIS — I21.4: Primary | ICD-10-CM

## 2020-10-05 DIAGNOSIS — J96.01: ICD-10-CM

## 2020-10-05 DIAGNOSIS — Z79.82: ICD-10-CM

## 2020-10-05 LAB
ALBUMIN SERPL BCG-MCNC: 4 GM/DL (ref 3.2–5.2)
ALT SERPL W P-5'-P-CCNC: 79 U/L (ref 12–78)
APTT BLD: 30.7 SECONDS (ref 24.2–38.5)
BASE EXCESS BLDA CALC-SCNC: -5.4 MMOL/L (ref -2–2)
BASE EXCESS BLDA CALC-SCNC: -7.7 MMOL/L (ref -2–2)
BASOPHILS # BLD AUTO: 0 10^3/UL (ref 0–0.2)
BASOPHILS NFR BLD AUTO: 0.3 % (ref 0–1)
BILIRUB CONJ SERPL-MCNC: 0.4 MG/DL (ref 0–0.2)
BILIRUB SERPL-MCNC: 0.8 MG/DL (ref 0.2–1)
BUN SERPL-MCNC: 26 MG/DL (ref 7–18)
CALCIUM SERPL-MCNC: 9.4 MG/DL (ref 8.8–10.2)
CHLORIDE SERPL-SCNC: 104 MEQ/L (ref 98–107)
CK MB CFR.DF SERPL CALC: 5.82
CK MB SERPL-MCNC: 3.2 NG/ML (ref ?–3.6)
CK SERPL-CCNC: 55 U/L (ref 39–308)
CO2 BLDA CALC-SCNC: 21.4 MEQ/L (ref 23–31)
CO2 BLDA CALC-SCNC: 22.6 MEQ/L (ref 23–31)
CO2 SERPL-SCNC: 24 MEQ/L (ref 21–32)
CREAT SERPL-MCNC: 1.14 MG/DL (ref 0.7–1.3)
EOSINOPHIL # BLD AUTO: 0.2 10^3/UL (ref 0–0.5)
EOSINOPHIL NFR BLD AUTO: 2.6 % (ref 0–3)
GFR SERPL CREATININE-BSD FRML MDRD: > 60 ML/MIN/{1.73_M2} (ref 35–?)
GLUCOSE SERPL-MCNC: 139 MG/DL (ref 70–100)
HCO3 BLDA-SCNC: 20.2 MEQ/L (ref 22–26)
HCO3 BLDA-SCNC: 20.9 MEQ/L (ref 22–26)
HCO3 STD BLDA-SCNC: 18.2 MEQ/L (ref 22–26)
HCO3 STD BLDA-SCNC: 20.1 MEQ/L (ref 22–26)
HCT VFR BLD AUTO: 40 % (ref 42–52)
HGB BLD-MCNC: 12.9 G/DL (ref 13.5–17.5)
INR PPP: 1.49
LYMPHOCYTES # BLD AUTO: 2.9 10^3/UL (ref 1.5–5)
LYMPHOCYTES NFR BLD AUTO: 33.4 % (ref 24–44)
MCH RBC QN AUTO: 33.2 PG (ref 27–33)
MCHC RBC AUTO-ENTMCNC: 32.3 G/DL (ref 32–36.5)
MCV RBC AUTO: 102.8 FL (ref 80–96)
MONOCYTES # BLD AUTO: 0.8 10^3/UL (ref 0–0.8)
MONOCYTES NFR BLD AUTO: 9.1 % (ref 0–5)
NEUTROPHILS # BLD AUTO: 4.7 10^3/UL (ref 1.5–8.5)
NEUTROPHILS NFR BLD AUTO: 54.3 % (ref 36–66)
NT-PRO BNP: 5256 PG/ML (ref ?–450)
PCO2 BLDA: 39.7 MMHG (ref 35–45)
PCO2 BLDA: 55.6 MMHG (ref 35–45)
PH BLDA: 7.19 UNITS (ref 7.35–7.45)
PH BLDA: 7.33 UNITS (ref 7.35–7.45)
PLATELET # BLD AUTO: 209 10^3/UL (ref 150–450)
PO2 BLDA: 127.8 MMHG (ref 75–100)
PO2 BLDA: 75 MMHG (ref 75–100)
POTASSIUM SERPL-SCNC: 4.7 MEQ/L (ref 3.5–5.1)
PROT SERPL-MCNC: 7.8 GM/DL (ref 6.4–8.2)
PROTHROMBIN TIME: 18.4 SECONDS (ref 12.5–14.3)
RBC # BLD AUTO: 3.89 10^6/UL (ref 4.3–6.1)
SAO2 % BLDA: 91.3 % (ref 95–99)
SAO2 % BLDA: 98.6 % (ref 95–99)
SODIUM SERPL-SCNC: 137 MEQ/L (ref 136–145)
T4 FREE SERPL-MCNC: 1.11 NG/DL (ref 0.76–1.46)
TROPONIN I SERPL-MCNC: 0.05 NG/ML (ref ?–0.1)
TSH SERPL DL<=0.005 MIU/L-ACNC: 10.2 UIU/ML (ref 0.36–3.74)
WBC # BLD AUTO: 8.7 10^3/UL (ref 4–10)

## 2020-10-05 RX ADMIN — IPRATROPIUM BROMIDE AND ALBUTEROL SULFATE SCH ML: .5; 3 SOLUTION RESPIRATORY (INHALATION) at 20:00

## 2020-10-05 RX ADMIN — ATORVASTATIN CALCIUM SCH MG: 20 TABLET, FILM COATED ORAL at 23:47

## 2020-10-05 RX ADMIN — FUROSEMIDE SCH MG: 10 INJECTION, SOLUTION INTRAMUSCULAR; INTRAVENOUS at 23:49

## 2020-10-05 RX ADMIN — FUROSEMIDE SCH MG: 10 INJECTION, SOLUTION INTRAMUSCULAR; INTRAVENOUS at 22:04

## 2020-10-05 NOTE — HPEPDOC
[facility] Medical History & Physical


Date of Admission


Oct 5, 2020


Date of Service:  Oct 5, 2020


Primary Care Physician:  Paulo Clayton MD


Attending Physician:  CAMERON HART MD





History and Physical


TIME OF SERVICE: 8:55 PM


   


CHIEF COMPLAINT: dizziness





HISTORY OF PRESENT ILLNESS: 


This 85-year-old gentleman was last admitted from Sep 6th to 8th for management 

of CAP. Today he reports feeling dizzy after he got out of the shower. Per d/w 

 the patient was reported to have lost consciousness, but when I 

interviewed the patient he denied this and reported that he was only dizzy. 

Shortly thereafter he developed worsening shortness of breath, and persistent 

"congestion" in his chest. At his baseline he has a cough that is productive of 

clear sputum that has not changed. He denies having f/c, CP, sick contacts or 

travelling but admits to having new bilateral foot swelling.  Per d/w  

the patient was hypoxic with an O2 of 86% on room air when he arrived; he was 

hypercapnic on arrival and put on BIPAP for a short period of time; because his 

blood gas improved he was weaned off to supplemental O2.  





REVIEW OF SYSTEMS: 12 point review of systems negative except as listed in HPI





PAST MEDICAL/ SURGICAL HISTORY:


Hx of lung cancer s/p left pneumonectomy and chemoradiation now in remission


Chronic Systolic CHF  


Chronic CAD status post PCI 2009, CABG 2008


COPD


Atrial fibrillation on Eliquis


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


s/p R CEA


CAB





SOCIAL HISTORY:





Resides in [place] most of the year and in [place] for the summer


Former smoker 





FAMILY HISTORY:


Father colon CA


Mother esophageal CA


Denies family hx of COPD


   


ALLERGIES: Please see below.





HOME MEDICATIONS: Please see below. 





PHYSICAL EXAMINATION:


Vital Signs








  Date Time  Temp Pulse Resp B/P (MAP) Pulse Ox O2 Delivery O2 Flow Rate FiO2


 


10/5/20 14:45    159/80 (106)    


 


10/5/20 14:47 97.0 83 22  96 Nasal Cannula 4.0 


 


10/5/20 16:04        93





GEN: slim build/ NAD


INTEGUMENT:  he doesn't have facial plethora


HEENT:NCAT / lips are note cyanotic / he has pursed lip breathing /  NC in place




CVS: RRR/ radial and dorsalis pedis pulses intact / +2 BLE edema from feet to 

mid shins / ICD at palpable at left upper chest


LUNGS: there is nasal flaring / not able to speak full sentences without 

stopping to take a breath / coughing white phlegm / using accessory muscles / 

there is decreased respiratory expansion/ he has prominent expiratory crackles


ABDOMEN: flat / soft & not tender with palpation


MSK/EXTREMITIES: NCAT


NEURO: CN 2-12 are grossly intact / speech is not dysarthric 


PSYCH: alert and oriented to person place and time/ able to understand and 

follow all commands





LABORATORY DATA: 


10/5/20 14:58








10/5/20 16:55





10/5/20 14:58: 


Immature Granulocyte % (Auto) 0.3, Neutrophils (%) (Auto) 54.3, Lymphocytes (%) 

(Auto) 33.4, Monocytes (%) (Auto) 9.1H, Eosinophils (%) (Auto) 2.6, Basophils 

(%) (Auto) 0.3, Neutrophils # (Auto) 4.7, Lymphocytes # (Auto) 2.9, Monocytes # 

(Auto) 0.8, Eosinophils # (Auto) 0.2, Basophils # (Auto) 0.0, Nucleated Red 

Blood Cells % (auto) 0.0, Prothrombin Time 18.4H, Prothromb Time International 

Ratio 1.49, Activated Partial Thromboplast Time 30.7


10/5/20 15:33: 


Blood Gas Bicarbonate Standard 18.2L, Arterial Blood pH 7.193*L, Arterial Blood 

Partial Pressure CO2 55.6H, Arterial Blood Partial Pressure O2 75.0, Arterial 

Blood Total CO2 22.6L, Arterial Blood HCO3 20.9L, Arterial Blood Base Excess -

7.7L, Arterial Blood Oxygen Saturation 91.3L


10/5/20 16:55: 


Anion Gap 9, Glomerular Filtration Rate > 60.0, Calcium Level 9.4, Total 

Bilirubin 0.8, Direct Bilirubin 0.4H, Aspartate Amino Transf (AST/SGOT) 52H, Ala

nine Aminotransferase (ALT/SGPT) 79H, Alkaline Phosphatase 175H, Total Creatine 

Kinase 55, Creatine Kinase MB 3.2, Creatine Kinase MB Relative Index 5.82H, 

Troponin I 0.05, Total Protein 7.8, Albumin 4.0, Albumin/Globulin Ratio 1.1, 

Thyroid Stimulating Hormone (TSH) 10.200H, Free Thyroxine 1.11


10/5/20 17:34: 


Blood Gas Bicarbonate Standard 20.1L, Arterial Blood pH 7.325L, Arterial Blood 

Partial Pressure CO2 39.7, Arterial Blood Partial Pressure O2 127.8H, Arterial 

Blood Total CO2 21.4L, Arterial Blood HCO3 20.2L, Arterial Blood Base Excess -

5.4L, Arterial Blood Oxygen Saturation 98.6





IMAGING: 


Chest xray "IMPRESSION: No significant change from prior study. Coarse 

interstitial changes throughout the right lung. Focal infiltrate in the right 

upper lobe bordering the minor fissure with no significant change."





MICROBIOLOGY: Respiratory panel neg / blood cx pending





ASSESSMENT: 


Mr. Case is an 85 yr old w a hx of lung cancer in remission, Chronic Systolic

CHF requiring ICD placement, Chronic CAD, COPD and Atrial fibrillation who 

presented w c/o of dizziness, dyspnea, BLE edema and congestion; he will be 

admitted for management of Acute Systolic CHF, Acute COPD and possible RUL PNA.





PLAN:


1. Acute Systolic CHF w ICD 


-cause of CHF exacerbation: possibly non-compliance with salt and or fluid 

restrictions vs inability to tolerate previous levels of fluid and salt intake 

due to progression of left ventricular dysfunction vs PNA (less likely )


Plan: admit to ICU for BIPAP (indication cardiogenic pulmonary edema) /  elevate

head of bed to 50 degrees/ strict Is/OS, daily weights, fluid restriction to 2L 

or 67oz, salt restriction to 2G / f/u serial Troponins to r/o new ischemic event

as the cause of acute CHF/ f/u Echo tommorow, the day time team may consider 

calling his PCP in [place] to get a copy of his old Echo to compare the 

results/ IV Lasix /  c/w coreg, and ramipril  /  avoid NSAIDS which can worsen 

CHF / BNP should be measured before discharge for prognostic purposes because 

high levels and levels that dont trend down are linked with increased mortality

and rehospitalization / he should  f/u w PCP or Cardiologist w/in one week of 

discharge to prevent readmission + refer to cardiac rehab / f/u w PCP for sleep 

apnea screening





2. Acute COPD


-trigger likely heart failure vs PNA (less likely)


Plan: indication for continuous BIPAP is moderate/severe dyspnea with a RR >25 /

continuous pulse oximetry / aspiration precautions /  f/u VBG in the morning / 

f/u sputum cx / Dunebs Q6H, Albuterol Q1HP, Prednisone +  PPI / Tessalon Pearls 

/ the day time team can refer her to Pulmonologist for repeat PFTs / will place 

a referral for Pulmonary Rehab which has been shown to reduce mortality if she 

attends within 90 days of discharge 





3.Questionable Pneumonia


-his productive cough has not changed, he doesn't have fever, chills or sick 

contacts


-he was recently admitted for PNA and the right sided lesion has not changed on 

imaging studies


Plan:  continuous pulse ox & supplemental O2/  f/u sputum cx, strep pneumo, 

legionella, MRSA & blood cx,/ Levofloxacin and Vancomycin /  tessalon pearls / 

Acetaminophen PRN for fever / the day time team may consider ordering a CT scan 

of the chest to r/o post-obstructive PNA and or recurrent lung cancer in the 

morning





4. Dizziness / Syncope ?


Likely 2/2 hypercapnia PCO2 was 55.6 on arrival


Plan: telemetry / orthostats / fall precautions 





5. Atrial fibrillation 


Plan: Eliquis





6. CAD s/p CABG / Carotid Stenosis s/p CEA


Plan: ASA, Zetia, Coreg Statin





7. Macrocytic Anemia


Likely 2/2 COPD


Plan; f/u CBC, B12 and folate





8. Transaminitis


Plan: f/u Hep panel and liver US





9. Protein Calorie Malnutrition  


Likey2/2 a combination of pulmonary cachexia and sarcopenia


BMI 17.6


Plan: the day time team may consider dietician consult for calorie count  





Polypharmacy:


DVT PROPHYLAXIS: n/a on Eliquis





DISPOSITION: likely home after more than 2 midnight's stay / the day time team 

may consider placing a PFS consult and a PT consult for early mobilization to 

prevent deconditioning





Home Medications


Scheduled


Apixaban (Eliquis) 2.5 Mg Tab, 2.5 MG PO BID


Aspirin (Aspirin EC) 81 Mg Tab, 81 MG PO DAILY


Atorvastatin Calcium (Atorvastatin Calcium) 80 Mg Tab, 80 MG PO QHS


Carvedilol (Carvedilol) 3.125 Mg Tab, 3.125 MG PO BID


Ezetimibe (Zetia) 10 Mg Tab, 10 MG PO DAILY


Furosemide (Furosemide) 20 Mg Tablet, 20 MG PO DAILY


   @ NOON 


Guaifenesin (Mucinex) 600 Mg Tab.er.12h, 1,200 MG PO BID


Ramipril (Ramipril) 2.5 Mg Cap, 2.5 MG PO DAILY


   USUALLY TAKES MIDDAY 





Scheduled PRN


Albuterol Sulfate (Ventolin Hfa) 18 Gm Hfa.aer.ad, 2 PUFFS INH Q4H PRN for 

SHORTNESS OF BREATH


Docusate Sodium (Colace) 100 Mg Capsule, 100 MG PO BID PRN for CONSTIPATION


Ipratropium/Albuterol Sulfate (Iprat-Albut 0.5-3(2.5) mg/3 ml) 3 Ml Ampul.neb, 3

ML NEB Q8HP PRN for SOB/COUGH





Allergies


Coded Allergies:  


     No Known Allergies (Unverified , 9/6/20)





A-FIB/CHADSVASC


A-FIB History


Current/History of A-Fib/PAF?:  Yes


Current PO Anticoag Therapy:  Yes











CAMERON HART MD                 Oct 5, 2020 19:47

## 2020-10-05 NOTE — REPVR
PROCEDURE INFORMATION: 

Exam: XR Chest, 1 View 

Exam date and time: 10/5/2020 3:54 PM 

Age: 85 years old 

Clinical indication: Chest pain; Type not specified 



TECHNIQUE: 

Imaging protocol: XR of the chest 

Views: 1 view. 



COMPARISON: 

CR PORTABLE CHEST X-RAY 9/6/2020 10:44 AM 



FINDINGS: 

Tubes, catheters and devices: Left-sided pacemaker/defibrillator. Sternal 

wires. 



Lungs: Opacification of the left hemithorax unchanged from prior study. There 

are coarse interstitial changes throughout the right lung with no significant 

change. 

Pleural space: There is focal infiltrate in the right upper lobe on ring the 

minor fissure. 

Heart/Mediastinum: Unremarkable. No cardiomegaly. 

Bones/joints: Degenerative changes of the spine. 



IMPRESSION: 

No significant change from prior study.

Coarse interstitial changes throughout the right lung. Focal infiltrate in the 

right upper lobe bordering the minor fissure with no significant change.



Electronically signed by: Jaspreet Lawson On 10/05/2020  16:22:37 PM

## 2020-10-05 NOTE — ECGEPIP
[facility]

                                       

                                       Test Date:    2020-10-05

Pat Name:     ADELFO REYES           Department:   

Patient ID:   [id number]                 Room:         -

Gender:       Male                     Technician:   [email or web address]

:          1935               Requested By: SUSY PARKER

Order Number: [id number]     Reading MD:   Paulo Clayton

                                 Measurements

Intervals                              Axis          

Rate:         89                       P:            

CT:           0                        QRS:          74

QRSD:         117                      T:            -57

QT:           344                                    

QTc:          419                                    

                           Interpretive Statements

PROBABLY SINUS RHYTHM WITH PAC'S 

INFERIOR MYOCARDIAL INFARCTION, OLD

COMPARED TO 20 SINUS RHYTHM REPLACED AF

Electronically Signed on 10-5-2020 21:00:33 EDT by Paulo Clayton

## 2020-10-06 VITALS — DIASTOLIC BLOOD PRESSURE: 56 MMHG | SYSTOLIC BLOOD PRESSURE: 108 MMHG

## 2020-10-06 VITALS — SYSTOLIC BLOOD PRESSURE: 79 MMHG | DIASTOLIC BLOOD PRESSURE: 50 MMHG

## 2020-10-06 VITALS — SYSTOLIC BLOOD PRESSURE: 93 MMHG | DIASTOLIC BLOOD PRESSURE: 51 MMHG

## 2020-10-06 VITALS — DIASTOLIC BLOOD PRESSURE: 54 MMHG | SYSTOLIC BLOOD PRESSURE: 90 MMHG

## 2020-10-06 VITALS — SYSTOLIC BLOOD PRESSURE: 113 MMHG | DIASTOLIC BLOOD PRESSURE: 62 MMHG

## 2020-10-06 VITALS — SYSTOLIC BLOOD PRESSURE: 86 MMHG | DIASTOLIC BLOOD PRESSURE: 53 MMHG

## 2020-10-06 VITALS — DIASTOLIC BLOOD PRESSURE: 53 MMHG | SYSTOLIC BLOOD PRESSURE: 83 MMHG

## 2020-10-06 VITALS — SYSTOLIC BLOOD PRESSURE: 74 MMHG | DIASTOLIC BLOOD PRESSURE: 49 MMHG

## 2020-10-06 VITALS — DIASTOLIC BLOOD PRESSURE: 50 MMHG | SYSTOLIC BLOOD PRESSURE: 88 MMHG

## 2020-10-06 VITALS — SYSTOLIC BLOOD PRESSURE: 79 MMHG | DIASTOLIC BLOOD PRESSURE: 51 MMHG

## 2020-10-06 VITALS — SYSTOLIC BLOOD PRESSURE: 89 MMHG | DIASTOLIC BLOOD PRESSURE: 54 MMHG

## 2020-10-06 VITALS — DIASTOLIC BLOOD PRESSURE: 48 MMHG | SYSTOLIC BLOOD PRESSURE: 76 MMHG

## 2020-10-06 VITALS — DIASTOLIC BLOOD PRESSURE: 54 MMHG | SYSTOLIC BLOOD PRESSURE: 86 MMHG

## 2020-10-06 VITALS — DIASTOLIC BLOOD PRESSURE: 64 MMHG | SYSTOLIC BLOOD PRESSURE: 111 MMHG

## 2020-10-06 VITALS — SYSTOLIC BLOOD PRESSURE: 96 MMHG | DIASTOLIC BLOOD PRESSURE: 54 MMHG

## 2020-10-06 VITALS — SYSTOLIC BLOOD PRESSURE: 94 MMHG | DIASTOLIC BLOOD PRESSURE: 64 MMHG

## 2020-10-06 VITALS — DIASTOLIC BLOOD PRESSURE: 56 MMHG | SYSTOLIC BLOOD PRESSURE: 98 MMHG

## 2020-10-06 LAB
BASE EXCESS BLDV CALC-SCNC: -3 MMOL/L (ref -2–2)
BASE EXCESS BLDV CALC-SCNC: -3.8 MMOL/L (ref -2–2)
BUN SERPL-MCNC: 28 MG/DL (ref 7–18)
CALCIUM SERPL-MCNC: 8.9 MG/DL (ref 8.8–10.2)
CHLORIDE SERPL-SCNC: 102 MEQ/L (ref 98–107)
CK MB CFR.DF SERPL CALC: 6.4
CK MB SERPL-MCNC: 5.7 NG/ML (ref ?–3.6)
CK SERPL-CCNC: 89 U/L (ref 39–308)
CO2 BLDV CALC-SCNC: 21.9 MEQ/L (ref 24–28)
CO2 BLDV CALC-SCNC: 25.1 MEQ/L (ref 24–28)
CO2 SERPL-SCNC: 25 MEQ/L (ref 21–32)
CREAT SERPL-MCNC: 1.09 MG/DL (ref 0.7–1.3)
FOLATE RBC-MCNC: 986 NG/ML (ref 280–791)
GFR SERPL CREATININE-BSD FRML MDRD: > 60 ML/MIN/{1.73_M2} (ref 35–?)
GLUCOSE SERPL-MCNC: 123 MG/DL (ref 70–100)
HBV CORE IGM SER QL: NEGATIVE
HBV SURFACE AB SER-ACNC: NEGATIVE M[IU]/ML
HCO3 BLDV-SCNC: 20.8 MEQ/L (ref 23–27)
HCO3 BLDV-SCNC: 23.6 MEQ/L (ref 23–27)
HCO3 STD BLDV-SCNC: 21.4 MEQ/L
HCO3 STD BLDV-SCNC: 21.8 MEQ/L
HCT VFR BLD AUTO: 36 % (ref 42–52)
HCT VFR BLD AUTO: 36.6 % (ref 42–52)
HCV AB SER QL: 0.1 INDEX (ref ?–0.8)
HEPATITIS A ANTIBODY IGM: NEGATIVE
HGB BLD-MCNC: 11.5 G/DL (ref 13.5–17.5)
MAGNESIUM SERPL-MCNC: 1.7 MG/DL (ref 1.8–2.4)
MCH RBC QN AUTO: 32.7 PG (ref 27–33)
MCHC RBC AUTO-ENTMCNC: 31.9 G/DL (ref 32–36.5)
MCV RBC AUTO: 102.3 FL (ref 80–96)
PCO2 BLDV: 36.3 MMHG (ref 38–50)
PCO2 BLDV: 48.6 MMHG (ref 38–50)
PH BLDV: 7.3 UNITS (ref 7.33–7.43)
PH BLDV: 7.38 UNITS (ref 7.33–7.43)
PLATELET # BLD AUTO: 158 10^3/UL (ref 150–450)
PO2 BLDV: 208.9 MMHG (ref 30–50)
PO2 BLDV: 59.7 MMHG (ref 30–50)
POTASSIUM SERPL-SCNC: 4 MEQ/L (ref 3.5–5.1)
RBC # BLD AUTO: 3.52 10^6/UL (ref 4.3–6.1)
SAO2 % BLDV: 87 % (ref 60–80)
SAO2 % BLDV: 99.6 % (ref 60–80)
SODIUM SERPL-SCNC: 138 MEQ/L (ref 136–145)
TROPONIN I SERPL-MCNC: 1.22 NG/ML (ref ?–0.1)
TROPONIN I SERPL-MCNC: 1.6 NG/ML (ref ?–0.1)
VIT B12 SERPL-MCNC: 381 PG/ML (ref 247–911)
WBC # BLD AUTO: 7.4 10^3/UL (ref 4–10)

## 2020-10-06 RX ADMIN — IPRATROPIUM BROMIDE AND ALBUTEROL SULFATE SCH ML: .5; 3 SOLUTION RESPIRATORY (INHALATION) at 02:19

## 2020-10-06 RX ADMIN — EZETIMIBE SCH MG: 10 TABLET ORAL at 10:43

## 2020-10-06 RX ADMIN — ATORVASTATIN CALCIUM SCH MG: 20 TABLET, FILM COATED ORAL at 21:27

## 2020-10-06 RX ADMIN — IPRATROPIUM BROMIDE AND ALBUTEROL SULFATE SCH ML: .5; 3 SOLUTION RESPIRATORY (INHALATION) at 14:37

## 2020-10-06 RX ADMIN — IPRATROPIUM BROMIDE AND ALBUTEROL SULFATE SCH ML: .5; 3 SOLUTION RESPIRATORY (INHALATION) at 19:17

## 2020-10-06 RX ADMIN — ASPIRIN SCH MG: 81 TABLET ORAL at 10:43

## 2020-10-06 RX ADMIN — IPRATROPIUM BROMIDE AND ALBUTEROL SULFATE SCH ML: .5; 3 SOLUTION RESPIRATORY (INHALATION) at 08:01

## 2020-10-06 RX ADMIN — FUROSEMIDE SCH MG: 10 INJECTION, SOLUTION INTRAMUSCULAR; INTRAVENOUS at 08:00

## 2020-10-06 RX ADMIN — FUROSEMIDE SCH MG: 10 INJECTION, SOLUTION INTRAMUSCULAR; INTRAVENOUS at 04:09

## 2020-10-06 RX ADMIN — ENOXAPARIN SODIUM SCH MG: 40 INJECTION SUBCUTANEOUS at 21:28

## 2020-10-06 RX ADMIN — ENOXAPARIN SODIUM SCH MG: 40 INJECTION SUBCUTANEOUS at 10:44

## 2020-10-06 RX ADMIN — PANTOPRAZOLE SODIUM SCH MG: 40 TABLET, DELAYED RELEASE ORAL at 10:42

## 2020-10-06 NOTE — IPNPDOC
Text Note


Date of Service


The patient was seen on 10/6/20.





NOTE


Subjective: Patient states that his breathing is better, he denied any chest 

pain or palpitations.





Objective:


GENERAL APPEARANCE: NAD


HEENT: no scleral icterus, no JVD, EOMI


CARDIOVASCULAR: Irregularly irregular


LUNGS: Diminished lung sounds


ABDOMEN: soft & not tender w palpitation


MUSCULOSKELETAL: no cyanosis, no swelling


INTEGUMENT: no generalized palor


NEUROLOGICAL: cranial nerve function from 2-12 intact intact, follows commands, 

speech not dysarthric








Assessment and plan


Patient is an 85 yr old w a hx of lung cancer in remission, Chronic Systolic CHF

requiring ICD placement, Chronic CAD, COPD and Atrial fibrillation who presented

w c/o of dizziness, dyspnea, BLE edema and congestion; he will be admitted for 

management of Acute Systolic CHF, Acute COPD and possible RUL PNA.








NSTEMI


Troponin elevated to 1.6


Patient denies any chest pain


Appreciate/agree with cardiologist consult


Continue with Lovenox, beta blockers, Plavix


Echo








Acute systolic CHF 


BNP significantly elevated


Is and os


Await echo result


Continue Lasix IV





Dyspnea/acute hypoxemic respiratory failure


Differential diagnosis includes COPD exacerbation, right lung pneumonia or CHF 

exacerbation


Patient doesn't have cough, leukocytosis, however chest x-ray showed possible 

right upper lung infiltrate.


We will proceed with CT chest


Await blood culture, procalcitonin ordered


Continue with antibiotics for now





Atrial fibrillation


Heart rate is under control


Continue anticoagulation therapy





CAD s/p CABG / Carotid Stenosis s/p CEA


ASA, Zetia, Coreg Statin








Macrocytic anemia


We'll check B12 and folate








Transaminitis


Hepatitis panel negative








Protein Calorie Malnutrition  


Patient has low BMI, albumin


Wasting of muscle mass


Full nutritionist evaluation


Ensure





VS,Fishbone, I+O


VS, Fishbone, I+O


Laboratory Tests


10/5/20 14:58








10/5/20 16:55








10/6/20 05:06











Vital Signs








  Date Time  Temp Pulse Resp B/P (MAP) Pulse Ox O2 Delivery O2 Flow Rate FiO2


 


10/6/20 10:22  85 18 94/64 (74) 99 Room Air  


 


10/6/20 08:00        25


 


10/6/20 08:00 97.9       


 


10/5/20 20:45       3.0 














I&O- Last 24 Hours up to 6 AM0 


 


 10/6/20





 06:00


 


Intake Total 1035 ml


 


Output Total 775 ml


 


Balance 260 ml

















RIC MATT DO             Oct 6, 2020 11:09

## 2020-10-06 NOTE — REPVR
PROCEDURE INFORMATION: 

Exam: CT Chest Without Contrast 

Exam date and time: 10/6/2020 9:43 AM 

Age: 85 years old 

Clinical indication: Chest pain; Additional info: Pna 



TECHNIQUE: 

Imaging protocol: Computed tomography of the chest without contrast. 

3D rendering (Not supervised by radiologist): MIP and/or 3D reconstructed 

images were created by the technologist. 

Radiation optimization: All CT scans at this facility use at least one of these 

dose optimization techniques: automated exposure control; mA and/or kV 

adjustment per patient size (includes targeted exams where dose is matched to 

clinical indication); or iterative reconstruction. 



COMPARISON: 

CR - PORTABLE CHEST X-RAY 10/5/2020 3:48:07 PM 



FINDINGS: 

Limitations: Evaluation is somewhat limited by lack of IV contrast. 

Tubes, catheters and devices: A left-sided AICD is again present. 



Lungs: Mild patchy ground-glass opacities are present in the right upper, 

middle and lower lobes. Minor superimposed airspace opacity is also present 

laterally in the right upper lobe. Findings suggest edema, infection or 

inflammation. The right lung also demonstrates mild centrilobular emphysematous 

disease. 

Pleural space: A small right pleural effusion is present. No pneumothorax. 

Heart: Unremarkable. No cardiomegaly. No pericardial effusion. 

Aorta: The thoracic aorta is nonaneurysmal. Atherosclerotic vascular 

calcifications are again present. 

Lymph nodes: No gross pathologic lymphadenopathy. 



Bones/joints: Median sternotomy wires are again present. Degenerative changes 

again involve the spine and shoulders. There are again chronic deformities of 

many of the left ribs. 

Soft tissues: There has again been left-sided pneumonectomy with the thoracic 

cavity containing heterogeneous soft tissue and calcifications, along with 

coarse calcifications of the left-sided pleura. 



IMPRESSION: 

1. Right-sided pulmonary findings as above suggesting edema, infection or 

inflammation, with a small right pleural effusion. Such imaging features can be 

seen with COVID-19 pneumonia, though are nonspecific and can occur with a 

variety of infectious and noninfectious processes. 

2. Mild centrilobular emphysematous disease. 

3. Operative changes of left-sided pneumonectomy. 



Electronically signed by: Star Joseph On 10/06/2020  10:54:21 AM

## 2020-10-06 NOTE — REPVR
PROCEDURE INFORMATION: 

Exam: US Abdomen, Limited; Right Upper Quadrant 

Exam date and time: 10/6/2020 10:16 AM 

Age: 85 years old 

Clinical indication: Condition or disease; Other: Transaminitis 



TECHNIQUE: 

Imaging protocol: US abdomen. Real time ultrasound with image documentation. 

Limited exam focused on the right upper quadrant. 



COMPARISON: 

No relevant prior studies available. 



FINDINGS: 

Pleural space: A right pleural effusion is noted. 

Liver: The liver is homogeneous in echotexture. No demonstrated mass or 

intrahepatic biliary ductal dilatation. 

Gallbladder: The gallbladder is without demonstrated stones, sludge or wall 

thickening, and there is no pericholecystic fluid. Sonographic Brandt sign is 

reportedly negative. 

Common bile duct: The common bile duct is normal in size for a patient of this 

age at 3.6 mm. 

Pancreas: The pancreas is obscured by overlying bowel gas. 

Right kidney: The right kidney measures 9.1 x 5.0 x 4.9 cm. Its central sinus 

fat appears somewhat echogenic. No hydronephrosis or demonstrated stone, cyst 

or mass. 



IMPRESSION: 

1. No demonstrated abnormality in the right upper quadrant, with the pancreas 

obscured by bowel gas. 

2. Right pleural effusion. 



Electronically signed by: Star Joseph On 10/06/2020  10:39:45 AM

## 2020-10-07 VITALS — DIASTOLIC BLOOD PRESSURE: 52 MMHG | SYSTOLIC BLOOD PRESSURE: 92 MMHG

## 2020-10-07 VITALS — SYSTOLIC BLOOD PRESSURE: 95 MMHG | DIASTOLIC BLOOD PRESSURE: 57 MMHG

## 2020-10-07 VITALS — SYSTOLIC BLOOD PRESSURE: 107 MMHG | DIASTOLIC BLOOD PRESSURE: 56 MMHG

## 2020-10-07 VITALS — OXYGEN SATURATION: 93 %

## 2020-10-07 VITALS — DIASTOLIC BLOOD PRESSURE: 54 MMHG | SYSTOLIC BLOOD PRESSURE: 98 MMHG

## 2020-10-07 VITALS — OXYGEN SATURATION: 98 % | DIASTOLIC BLOOD PRESSURE: 59 MMHG | SYSTOLIC BLOOD PRESSURE: 95 MMHG

## 2020-10-07 VITALS — SYSTOLIC BLOOD PRESSURE: 82 MMHG | DIASTOLIC BLOOD PRESSURE: 52 MMHG

## 2020-10-07 VITALS — SYSTOLIC BLOOD PRESSURE: 92 MMHG | DIASTOLIC BLOOD PRESSURE: 51 MMHG

## 2020-10-07 VITALS — OXYGEN SATURATION: 95 %

## 2020-10-07 VITALS — SYSTOLIC BLOOD PRESSURE: 100 MMHG | DIASTOLIC BLOOD PRESSURE: 59 MMHG

## 2020-10-07 VITALS — DIASTOLIC BLOOD PRESSURE: 61 MMHG | SYSTOLIC BLOOD PRESSURE: 110 MMHG

## 2020-10-07 LAB
ALBUMIN SERPL BCG-MCNC: 3.3 GM/DL (ref 3.2–5.2)
ALT SERPL W P-5'-P-CCNC: 49 U/L (ref 12–78)
BASOPHILS # BLD AUTO: 0 10^3/UL (ref 0–0.2)
BASOPHILS NFR BLD AUTO: 0 % (ref 0–1)
BILIRUB SERPL-MCNC: 0.7 MG/DL (ref 0.2–1)
BUN SERPL-MCNC: 36 MG/DL (ref 7–18)
CALCIUM SERPL-MCNC: 8.4 MG/DL (ref 8.8–10.2)
CHLORIDE SERPL-SCNC: 103 MEQ/L (ref 98–107)
CO2 SERPL-SCNC: 29 MEQ/L (ref 21–32)
CREAT SERPL-MCNC: 1.38 MG/DL (ref 0.7–1.3)
EOSINOPHIL # BLD AUTO: 0 10^3/UL (ref 0–0.5)
EOSINOPHIL NFR BLD AUTO: 0 % (ref 0–3)
GFR SERPL CREATININE-BSD FRML MDRD: 52.4 ML/MIN/{1.73_M2} (ref 35–?)
GLUCOSE SERPL-MCNC: 158 MG/DL (ref 70–100)
HCT VFR BLD AUTO: 29.2 % (ref 42–52)
HGB BLD-MCNC: 9.6 G/DL (ref 13.5–17.5)
LYMPHOCYTES # BLD AUTO: 0.6 10^3/UL (ref 1.5–5)
LYMPHOCYTES NFR BLD AUTO: 5.7 % (ref 24–44)
MAGNESIUM SERPL-MCNC: 1.8 MG/DL (ref 1.8–2.4)
MCH RBC QN AUTO: 32.7 PG (ref 27–33)
MCHC RBC AUTO-ENTMCNC: 32.9 G/DL (ref 32–36.5)
MCV RBC AUTO: 99.3 FL (ref 80–96)
MONOCYTES # BLD AUTO: 0.9 10^3/UL (ref 0–0.8)
MONOCYTES NFR BLD AUTO: 8.9 % (ref 0–5)
NEUTROPHILS # BLD AUTO: 8.4 10^3/UL (ref 1.5–8.5)
NEUTROPHILS NFR BLD AUTO: 84.9 % (ref 36–66)
PLATELET # BLD AUTO: 131 10^3/UL (ref 150–450)
POTASSIUM SERPL-SCNC: 3.7 MEQ/L (ref 3.5–5.1)
PROT SERPL-MCNC: 6.1 GM/DL (ref 6.4–8.2)
RBC # BLD AUTO: 2.94 10^6/UL (ref 4.3–6.1)
SODIUM SERPL-SCNC: 138 MEQ/L (ref 136–145)
WBC # BLD AUTO: 9.9 10^3/UL (ref 4–10)

## 2020-10-07 RX ADMIN — PANTOPRAZOLE SODIUM SCH MG: 40 TABLET, DELAYED RELEASE ORAL at 09:15

## 2020-10-07 RX ADMIN — ENOXAPARIN SODIUM SCH MG: 40 INJECTION SUBCUTANEOUS at 09:16

## 2020-10-07 RX ADMIN — IPRATROPIUM BROMIDE AND ALBUTEROL SULFATE SCH ML: .5; 3 SOLUTION RESPIRATORY (INHALATION) at 07:22

## 2020-10-07 RX ADMIN — ENOXAPARIN SODIUM SCH MG: 40 INJECTION SUBCUTANEOUS at 20:13

## 2020-10-07 RX ADMIN — EZETIMIBE SCH MG: 10 TABLET ORAL at 09:15

## 2020-10-07 RX ADMIN — ASPIRIN SCH MG: 81 TABLET ORAL at 09:15

## 2020-10-07 RX ADMIN — IPRATROPIUM BROMIDE AND ALBUTEROL SULFATE SCH ML: .5; 3 SOLUTION RESPIRATORY (INHALATION) at 01:46

## 2020-10-07 RX ADMIN — CLOPIDOGREL BISULFATE SCH MG: 75 TABLET ORAL at 09:15

## 2020-10-07 RX ADMIN — ATORVASTATIN CALCIUM SCH MG: 20 TABLET, FILM COATED ORAL at 20:13

## 2020-10-07 NOTE — ECHO
DATE OF PROCEDURE: 10/06/2020 



Age: 85

Gender: Male

Height: 170 cm 

Weight: 63 kg 



REFERRING PHYSICIAN: Marcial Tubbs DO  



INDICATION: Acute coronary syndrome, congestive heart failure.  



MEASUREMENTS:

   Aorta 3.7 cm

   LA 4.5 cm

   IVS 1.0 cm

   LV 4.7 cm

   LVPW 1.2 cm

   IVC 1.1 cm

   Mitral E wave velocity 81

   Mitral A wave 37 



FINDINGS: 

This study is rather limited technical quality with difficult visualization. The
patient is in sinus rhythm. Left ventricle is normal size. There is inferior 
wall hypokinesis and atypical septal motion. Apical and anterior wall were 
poorly visualized. I overall estimated at least mild left ventricular (LV) 
systolic dysfunction with ejection fraction (EF) around 40% to 45% range. The 
right ventricle appears dilated and hypokinetic. The left atrium appears at 
least moderately enlarged. The right atrium is likely severely enlarged. Aortic 
valve was poorly visualized. It is heavily calcific and there is restriction of 
cusp mobility. Based on 2D imaging, I would estimate at least moderate aortic 
stenosis. There are degenerative abnormalities of the mitral valve with mitral 
annular calcifications and mild thickening of leaflets. Mobility seems to be 
preserved. Tricuspid valve was poorly seen. No obvious abnormalities were noted.
The same applies for pulmonic valve. There is no pericardial effusion. Inferior 
vena cava is normal size. Aortic root is borderline dilated at 3.7 cm. The 
aortic arch and abnormal aorta were not well seen. 



Doppler interrogation of the aortic valve reveals no insufficiency and only 
trivial stenosis with peak gradient 10 and mean gradient 6 mmHg, but I do 
believe that the Doppler imaging was not properly conducted because the 
alignment with the left ventricular outflow tract (LVOT) flow was never 
achieved. I estimate that there is worse than mild aortic stenosis, but unlikely
severe aortic stenosis. There is approximately mild-to-moderate mitral 
insufficiency and moderate tricuspid insufficiency. Calculated pulmonary artery 
pressure is in the 40s corresponding to moderate pulmonary hypertension. 
Pulmonic valve is functionally competent. 



The mitral inflow pattern reveals E wave much higher than A wave, but 
unfortunately tissue Doppler velocities of the mitral annulus were not 
performed. I do assume that this most likely represents pseudonormalization 
pattern and type 2 diastolic dysfunction. 



CONCLUSIONS:

1.  Study is of limited technical quality, patient is in sinus rhythm. 

2.  Normal left ventricular (LV) size with segmental wall motion abnormalities 
and overall approximately moderate left ventricular systolic dysfunction, grade 
2 diastolic dysfunction. 

3.  Heavily calcific aortic valve with only mild aortic stenosis by Doppler 
imaging, but quality of the Doppler was poor and I suspect that there is worse 
than mild aortic stenosis, but unlikely severe aortic stenosis. 

4.  Mild-to-moderate mitral insufficiency. 

5.  Mild-to-moderate tricuspid insufficiency. 

6.  Normal central venous pressure, but moderate pulmonary hypertension. 

7.  Implantable cardioverter defibrillator (ICD) lead apparent in right-sided 
heart chambers. 

MTDD

## 2020-10-07 NOTE — CR
DATE OF CONSULTATION: 10/06/2020



REFERRING PROVIDER: Dr. Crenshaw



INDICATION: Congestive heart failure, elevated troponin.



HISTORY OF PRESENT ILLNESS:  Mr. Zayas is an 85-year-old man who permanently 
resides in [place] and is visiting here in summertime. He has known 
coronary artery disease with remote history of coronary artery bypass graft 
(CABG) and percussion coronary intervention (PCI). He has defibrillator in 
place, and he has also known mitral insufficiency. He presented to our facility 
yesterday with complaint of dizziness. He said that his dyspnea that is chronic 
was not markedly worse, at least subjectively. He did not have any chest 
discomfort, and he did not have any palpitations. On presentation to the 
emergency room (ER), he was markedly hypoxic and borderline hypotensive. He was 
treated with administration of bilevel positive airway pressure (BiPAP) and 
diuretics after the brain natriuretic peptic (BNP) was markedly elevated. 
Overnight he did not have a really vigorous urine output, but he is feeling much
better this morning. He denies any chest discomfort throughout, and he denies 
any recent chest discomfort. He did not appreciate any recent sickness, and he 
denied any changes in his medications. His troponin overnight became mildly 
elevated, over 1, and consequently Dr. Crenshaw asked me to see him.



At the bedside, patient had his BiPAP on,  that I took off during my history 
taking. He says that he feels better. Does not have any acute real complaint and
is happy to get the BiPAP off his face.



MEDICAL HISTORY:  

1. Coronary artery disease with history of coronary artery bypass graft (CABG) 
in  and reportedly subsequent PCI in . I do not have any details 
available.

2. History of chronic systolic congestive heart failure. At this point unknown 
ejection fraction but felt to be low.

3. History of lung cancer, status post left pneumonectomy, radiation, and 
chemotherapy many years ago.

4. Chronic obstructive pulmonary disease (COPD).

5. History of paroxysmal, possibly even chronic, atrial fibrillation, on 
Eliquis.

6. History of right carotid endarterectomy.

7. Recent hospitalization in our facility for tentative pneumonia.



OUTPATIENT MEDICATIONS: 

- albuterol as needed

- Eliquis 2.5 twice a day

- aspirin 81 mg daily 

- atorvastatin 80 mg daily 

- carvedilol 3.125 mg twice a day 

- Colace as needed 

- Zetia 10 mg daily 

- furosemide 20 mg daily 

- Mucinex as needed 

- ramipril 2.5 mg daily 



ALLERGIES: No allergies.



SOCIAL HISTORY: Patient is . Resident of [place]. Used to smoke but 
quit many years ago. Denies significant alcohol use.



FAMILY HISTORY:  Father  of colon cancer, mother of esophageal cancer.



REVIEW OF SYSTEMS: He denies any recent fever, chills, nausea, vomiting, 
diarrhea. There has been no chest pain, palpitations, syncope, near syncope, or 
implantable cardioverter defibrillator (ICD) discharges. The rest of review of 
systems is negative or as per history of present illness (HPI).



PHYSICAL EXAMINATION: 

VITAL SIGNS: Blood pressure during my interview was only 90/60, heart rate 
around 90 beats per minute; appears regular. Afebrile. Saturation was 100% on 
BiPAP with 25% FiO2, and he remained in 90s after removal of the BiPAP. Jugular 
venous pressure (JVP) somewhat difficult to  but does not appear high. 
Lungs reveal occasional end-inspiratory crackle over mostly right base. I do not
appreciate any wheezing. The left side of his chest is dull to percussion. The 
heart exam is somewhat challenging due to unusual position of his heart in his 
chest, but I do not appreciate any obvious rub. There is a murmur best heard at 
the apex, probably about 2/6 intensity, blowing in nature, very likely 
indicative of mitral insufficiency. There is an ICD in left subclavian pocket. 
Abdomen is soft without obvious tenderness or rebound tenderness. I do not 
appreciate any hepatosplenomegaly. Extremities: About 1+ edema to mid shins. 
Neurologically, he is alert, oriented, and appropriate. No focal weakness is 
appreciated.



LABORATORY DATA:  

CBC:  WBC count is 7.4, hemoglobin 11.5, hematocrit 36, and platelet count 
158,000. 



Basic metabolic panel: Sodium 138, potassium 4.0, BUN 28, creatinine 1.1, and 
glucose 128. He had several liver function tests drawn that were all negative. 
Initial troponin on admission yesterday afternoon was 0.05, then at 10:30 last 
night 0.5, then 5 o'clock this morning 1.6. His N-terminal BNP.



ECGs from today and this morning revealed probably atrial fibrillation, even 
though he potentially could be sinus rhythm. The P waves are very subtle, but 
the underlying rhythm is regular. No obvious ST-T abnormalities and questionable
old anterior wall myocardial infarction. 



Chest x-ray reveals status post left pneumonectomy and sternotomy. There is an 
ICD that single lead, and there appears to be vascular distribution in his right
lung. I do not appreciate any distinct infiltrate as such.



ASSESSMENT AND PLAN: 

Mr. Case is an 85-year-old man who has ischemic cardiomyopathy, chronic 
ischemic heart disease, who presents with dizziness and is found to be hypoxic 
and I suspect in congestive heart failure. With the use of noninvasive 
ventilation, he seems much improved, at least objectively, but his troponin is 
mildly elevated. He does not have any chest pain and did not have any in the 
last several days. His ECG does not show any obvious evolution. At this point, I
would continue conservative management for his presumed acute coronary syndrome,
most likely demand ischemia.  But if there is further evolution, we certainly 
can re-evaluate the approach and consider transfer for coronary angiography. At 
this point, I would continue aspirin. I am going to discontinue Eliquis and give
him Lovenox instead, which will give us better  flexibility if he needs to go to
the catheterization lab, and I am going to start him on Plavix, at least 
temporarily. He denies any history of bleeding complications.



The second issue is that of congestive heart failure. The management is 
complicated by his low blood pressure. Consequently, we will put holding 
parameters on his diuretics and his angiotensin-converting enzyme (ACE) 
inhibitors. But dyspnea seems to be much improved, and it is possible that he 
will not need much more diuresis. If his condition should deteriorate in this 
regard, though, then we may need to consider more invasive measures. He is 
already on a very high dose of lipid-lowering medications, and as far as beta 
blockers are concerned, he is on very low dose of Coreg, but in setting of heart
failure, I am reluctant to increase the dose of beta blocker in this setting. 
His condition is certainly guarded. I will follow him along with you. 

MODESTO

## 2020-10-07 NOTE — IPNPDOC
Text Note


Date of Service


The patient was seen on 10/7/20.





NOTE


Subjective: No any acute events overnight, patient denies fever, chest pain, 

palpitations, chills, nausea, vomiting, diarrhea 





Objective:


GENERAL APPEARANCE: NAD


HEENT: no scleral icterus, no JVD, EOMI


CARDIOVASCULAR: Irregularly irregular


LUNGS: Diminished lung sounds


ABDOMEN: soft & not tender w palpitation


MUSCULOSKELETAL: no cyanosis, no swelling


INTEGUMENT: no generalized palor


NEUROLOGICAL: cranial nerve function from 2-12 intact intact, follows commands, 

speech not dysarthric








Assessment and plan


Patient is an 85 yr old w a hx of lung cancer in remission, Chronic Systolic CHF

requiring ICD placement, Chronic CAD, COPD and Atrial fibrillation who presented

w c/o of dizziness, dyspnea, BLE edema and congestion; he will be admitted for 

management of Acute Systolic CHF, Acute COPD and possible RUL PNA.








NSTEMI


Troponin elevated to 1.6


Trended down to 1.


Patient denies any chest pain


Patient refused cardiac catheterization


Continue with Lovenox, beta blockers, Plavix


Await Echo report








Acute systolic CHF 


BNP significantly elevated


Is and os


Await echo result


Continue Lasix IV





Dyspnea/acute hypoxemic respiratory failure


Differential diagnosis includes COPD exacerbation, right lung pneumonia or CHF 

exacerbation


Patient doesn't have cough, leukocytosis, however chest x-ray showed possible 

right upper lung infiltrate.


CT chest negative for acute infiltrate, pro-calcitonin negative


blood culture negative, procalcitonin ordered


DC antibiotics for now





Atrial fibrillation


Heart rate is under control


Continue anticoagulation therapy





CAD s/p CABG / Carotid Stenosis s/p CEA


ASA, Zetia, Coreg Statin








Macrocytic anemia


We'll check B12 and folate








Transaminitis


Hepatitis panel negative








Protein Calorie Malnutrition  


Patient has low BMI, albumin


Wasting of muscle mass


Full nutritionist evaluation


Ensure





VS,Fishbone, I+O


VS, Fishbone, I+O


Laboratory Tests


10/7/20 04:44











Vital Signs








  Date Time  Temp Pulse Resp B/P (MAP) Pulse Ox O2 Delivery O2 Flow Rate FiO2


 


10/7/20 09:14  87  110/61    


 


10/7/20 09:13     98   


 


10/7/20 08:00 98.8  16   Room Air  


 


10/6/20 08:00        25


 


10/5/20 20:45       3.0 














I&O- Last 24 Hours up to 6 AM 


 


 10/7/20





 06:00


 


Intake Total 770 ml


 


Output Total 700 ml


 


Balance 70 ml

















DROZHZHIN,RIC DO             Oct 7, 2020 11:27

## 2020-10-08 VITALS — OXYGEN SATURATION: 94 %

## 2020-10-08 VITALS — SYSTOLIC BLOOD PRESSURE: 102 MMHG | OXYGEN SATURATION: 95 % | DIASTOLIC BLOOD PRESSURE: 53 MMHG

## 2020-10-08 VITALS — OXYGEN SATURATION: 96 % | SYSTOLIC BLOOD PRESSURE: 112 MMHG | DIASTOLIC BLOOD PRESSURE: 58 MMHG

## 2020-10-08 VITALS — OXYGEN SATURATION: 95 %

## 2020-10-08 VITALS — DIASTOLIC BLOOD PRESSURE: 56 MMHG | SYSTOLIC BLOOD PRESSURE: 111 MMHG

## 2020-10-08 VITALS — SYSTOLIC BLOOD PRESSURE: 111 MMHG | DIASTOLIC BLOOD PRESSURE: 56 MMHG

## 2020-10-08 VITALS — OXYGEN SATURATION: 96 %

## 2020-10-08 LAB
ALBUMIN SERPL BCG-MCNC: 3.2 GM/DL (ref 3.2–5.2)
ALT SERPL W P-5'-P-CCNC: 48 U/L (ref 12–78)
BASOPHILS # BLD AUTO: 0 10^3/UL (ref 0–0.2)
BASOPHILS NFR BLD AUTO: 0 % (ref 0–1)
BILIRUB SERPL-MCNC: 0.6 MG/DL (ref 0.2–1)
BUN SERPL-MCNC: 34 MG/DL (ref 7–18)
CALCIUM SERPL-MCNC: 8.3 MG/DL (ref 8.8–10.2)
CHLORIDE SERPL-SCNC: 106 MEQ/L (ref 98–107)
CO2 SERPL-SCNC: 27 MEQ/L (ref 21–32)
CREAT SERPL-MCNC: 1.13 MG/DL (ref 0.7–1.3)
EOSINOPHIL # BLD AUTO: 0 10^3/UL (ref 0–0.5)
EOSINOPHIL NFR BLD AUTO: 0 % (ref 0–3)
GFR SERPL CREATININE-BSD FRML MDRD: > 60 ML/MIN/{1.73_M2} (ref 35–?)
GLUCOSE SERPL-MCNC: 110 MG/DL (ref 70–100)
HCT VFR BLD AUTO: 31.7 % (ref 42–52)
HGB BLD-MCNC: 10.5 G/DL (ref 13.5–17.5)
LYMPHOCYTES # BLD AUTO: 0.6 10^3/UL (ref 1.5–5)
LYMPHOCYTES NFR BLD AUTO: 7.5 % (ref 24–44)
MAGNESIUM SERPL-MCNC: 2.1 MG/DL (ref 1.8–2.4)
MCH RBC QN AUTO: 33 PG (ref 27–33)
MCHC RBC AUTO-ENTMCNC: 33.1 G/DL (ref 32–36.5)
MCV RBC AUTO: 99.7 FL (ref 80–96)
MONOCYTES # BLD AUTO: 0.8 10^3/UL (ref 0–0.8)
MONOCYTES NFR BLD AUTO: 10.5 % (ref 0–5)
NEUTROPHILS # BLD AUTO: 6.5 10^3/UL (ref 1.5–8.5)
NEUTROPHILS NFR BLD AUTO: 81.6 % (ref 36–66)
PHOSPHATE SERPL-MCNC: 2.5 MG/DL (ref 2.5–4.9)
PLATELET # BLD AUTO: 142 10^3/UL (ref 150–450)
POTASSIUM SERPL-SCNC: 3.8 MEQ/L (ref 3.5–5.1)
PROT SERPL-MCNC: 6.1 GM/DL (ref 6.4–8.2)
RBC # BLD AUTO: 3.18 10^6/UL (ref 4.3–6.1)
SODIUM SERPL-SCNC: 140 MEQ/L (ref 136–145)
WBC # BLD AUTO: 8 10^3/UL (ref 4–10)

## 2020-10-08 RX ADMIN — EZETIMIBE SCH MG: 10 TABLET ORAL at 08:45

## 2020-10-08 RX ADMIN — ASPIRIN SCH MG: 81 TABLET ORAL at 08:44

## 2020-10-08 RX ADMIN — POTASSIUM CHLORIDE SCH MLS/HR: 7.46 INJECTION, SOLUTION INTRAVENOUS at 06:59

## 2020-10-08 RX ADMIN — POTASSIUM CHLORIDE SCH MLS/HR: 7.46 INJECTION, SOLUTION INTRAVENOUS at 05:52

## 2020-10-08 RX ADMIN — ENOXAPARIN SODIUM SCH MG: 40 INJECTION SUBCUTANEOUS at 08:46

## 2020-10-08 RX ADMIN — CLOPIDOGREL BISULFATE SCH MG: 75 TABLET ORAL at 08:44

## 2020-10-08 NOTE — ECGEPIP
[facility]

                                       

                                       Test Date:    2020-10-07

Pat Name:     ADELFO REYES           Department:   

Patient ID:   [id number]                 Room:         [place] [address]

Gender:       Male                     Technician:   

:          [id number]               Requested By: Paulo Clayton 

Order Number: [id number]     Reading MD:   Paulo Clayton

                                 Measurements

Intervals                              Axis          

Rate:         77                       P:            259

DE:           104                      QRS:          55

QRSD:         125                      T:            -79

QT:           462                                    

QTc:          524                                    

                           Interpretive Statements

SINUS RHYTHM

FIRST DGR AV BLOCK

RIGHT BUNDLE BRANCH BLOCK

INFERIOR WALL MI, OLD

NON-SPECIFIC STT ABNORMALITIES

COMPARED TO 10/6/20 QRS COMPLEX IS SLIGHTLY WIDER AND STT ABNORMALITIES ARE

MORE 

APPARENT

Electronically Signed on 10-8-2020 19:29:27 EDT by Paulo Clayton

## 2020-10-08 NOTE — DS.PDOC
Discharge Summary


General


Date of Admission


Oct 5, 2020 at 19:39


Date of Discharge


10/8/20





Discharge Summary


PROCEDURES PERFORMED DURING STAY: [None].





ADMITTING DIAGNOSES: 


NSTEMI


Acute systolic CHF 


Atrial fibrillation


Transaminitis


Macrocytic anemia


CAD s/p CABG / Carotid Stenosis s/p CEA


Dyspnea/acute hypoxemic respiratory failure


Protein Calorie Malnutrition  


DISCHARGE DIAGNOSES:


NSTEMI


Acute systolic CHF 


Atrial fibrillation


Transaminitis


Macrocytic anemia


CAD s/p CABG / Carotid Stenosis s/p CEA


Dyspnea/acute hypoxemic respiratory failure


Protein Calorie Malnutrition  





COMPLICATIONS/CHIEF COMPLAINT: Pneumonia.





HISTORY OF PRESENT ILLNESS:  Mr. Zayas is an 85-year-old man who permanently 

resides in [place] and is visiting her in summertime. He has known 

coronaryartery disease with remote history of coronary artery bypass graft 

(CABG) and percussion coronary intervention (PCI). He has defibrillator in 

place, and he has also known mitral insufficiency. He presented to our facility 

yesterday withcomplaint of dizziness. He said that his dyspnea that is chronic 

was not markedly worse, at least subjectively. He did not have any chest 

discomfort, andhe did not have any palpitations. On presentation to the 

emergency room (ER), hewas markedly hypoxic and borderline hypotensive. He was 

treated with administration of bilevel positive airway pressure (BiPAP) and 

diuretics after the brain natriuretic peptic (BNP) was markedly elevated. 

Overnight he did not have a really vigorous urine output, but he is feeling much

 better this morning.He denies any chest discomfort throughout, and he denies 

any recent chest discomfort. He did not appreciate any recent sickness, and he 

denied any changesin his medications. His troponin overnight became mildly 

elevated, over 1, and consequently 





HOSPITAL COURSE: During hospital stay following issue addressed


NSTEMI


Troponin elevated to 1.6


Trended down to 1.2


Patient denies any chest pain


Patient refused cardiac catheterization


Received Lovenox, beta blockers, Plavix











Acute systolic CHF 


BNP significantly elevated


Is and os


Continue Lasix IV





Dyspnea/acute hypoxemic respiratory failure


Differential diagnosis includes COPD exacerbation, right lung pneumonia or CHF 

exacerbation


Patient doesn't have cough, leukocytosis, however chest x-ray showed possible 

right upper lung infiltrate.


CT chest negative for acute infiltrate, pro-calcitonin negative


blood culture negative, procalcitonin ordered


DC antibiotics 





Atrial fibrillation


Heart rate is under control


Continue anticoagulation therapy





CAD s/p CABG / Carotid Stenosis s/p CEA


ASA, Zetia, Coreg Statin





Transaminitis


Hepatitis panel negative








Protein Calorie Malnutrition  


Patient has low BMI, albumin


Wasting of muscle mass


Full nutritionist evaluation


Ensure











DISCHARGE MEDICATIONS: Please see below.


 


ALLERGIES: Please see below.





PHYSICAL EXAMINATION ON DISCHARGE:


VITAL SIGNS: Please see below.


HEENT: no scleral icterus, no JVD, EOMI


CARDIOVASCULAR: Irregularly irregular


LUNGS: Diminished lung sounds


ABDOMEN: soft & not tender w palpitation


MUSCULOSKELETAL: no cyanosis, no swelling


INTEGUMENT: no generalized palor


NEUROLOGICAL: cranial nerve function from 2-12 intact intact, follows commands, 

speech not dysarthric








LABORATORY DATA: Please see below.





IMAGING: 


INDICATION: Acute coronary syndrome, congestive heart failure.  





MEASUREMENTS:


   Aorta 3.7 cm


   LA 4.5 cm


   IVS 1.0 cm


   LV 4.7 cm


   LVPW 1.2 cm


   IVC 1.1 cm


   Mitral E wave velocity 81


   Mitral A wave 37 





FINDINGS: 


This study is rather limited technical quality with difficult visualization. 

Thepatient is in sinus rhythm. Left ventricle is normal size. There is inferior 

wall hypokinesis and atypical septal motion. Apical and anterior wall were 

poorly visualized. I overall estimated at least mild left ventricular (LV) 

systolic dysfunction with ejection fraction (EF) around 40% to 45% range. The 

right ventricle appears dilated and hypokinetic. The left atrium appears at 

least moderately enlarged. The right atrium is likely severely enlarged. Aortic 

valve was poorly visualized. It is heavily calcific and there is restriction of 

cusp mobility. Breast biopsy 2D imaging, I would estimate at least moderate 

aortic stenosis. There are degenerative abnormalities of the mitral valve with 

mitral annular calcifications and mild thickening of leaflets. Mobility seems 

akhil preserved. Tricuspid valve was poorly seen. No obvious abnormalities were 

noted. The same applies for pulmonic valve. There is no pericardial effusion. 

Inferior vena cava is normal size. Aortic root is borderline dilated at 3.7 cm. 

The aortic arch and abnormal aorta were not well seen. 





Doppler interrogation of the aortic valve reveals no insufficiency and only 

trivial stenosis with peak gradient 10 and mean gradient 6 mmHg, but I do 

believe that the Doppler imaging was not properly conducted because the 

alignment with the left ventricular outflow tract (LVOT) flow was never 

achievedand I estimate that there is worse than mild aortic stenosis, but 

unlikely severe aortic stenosis. There is approximately mild-to-moderate mitral 

insufficiency and moderate tricuspid insufficiency. Calculated pulmonary artery 

pressure is in the 40s corresponding to moderate pulmonary hypertension. 

Pulmonic valve is functionally component. 





The mitral inflow pattern reveals E wave much higher than A wave, but 

unfortunately tissue Doppler velocities of the mitral annulus were not perfo

rmed. I do assume that this most likely represents pseudonormalization pattern 

and type 2 diastolic dysfunction. 





CONCLUSIONS:


1.  Study is of limited technical quality, patient is in sinus rhythm. 


2.  Normal left ventricular (LV) size with segmental wall motion abnormalities 

and overall approximately moderate left ventricular systolic dysfunction, grade 

2 diastolic dysfunction. 


3.  Heavily calcific aortic valve with only mild aortic stenosis by Doppler 

imaging, but quality of the Doppler was poor and I suspect that there is worse 

than mild aortic stenosis, but unlikely severe aortic stenosis. 


4.  Mild-to-moderate mitral insufficiency. 


5.  Mild-to-moderate tricuspid insufficiency. 


6.  Normal central venous pressure, but moderate pulmonary hypertension. 


7.  Implantable cardioverter defibrillator (ICD) lead apparent in right-sided 

heart chambers. 





DD:   Paulo Clayton MD  10/07/20 0800


DT:   MORENA  10/07/20 0959  


DS:       


     


DS2:     





PROGNOSIS: fair





ACTIVITY: [As tolerated].





DIET: Cardiac 





DISCHARGE PLAN: Patient has appointment with his cardiologist in [place] on

the next week.





DISPOSITION: 01 Home, Self-Care.











DISCHARGE CONDITION: [Stable].





TIME SPENT ON DISCHARGE: Greater than 40 minutes.





Vital Signs/I&Os





Vital Signs








  Date Time  Temp Pulse Resp B/P (MAP) Pulse Ox O2 Delivery O2 Flow Rate FiO2


 


10/8/20 08:45  102  111/56    


 


10/8/20 08:37 98.7  16  94 Room Air  


 


10/6/20 08:00        25


 


10/5/20 20:45       3.0 














I&O- Last 24 Hours up to 6 AM 


 


 10/8/20





 06:00


 


Intake Total 770 ml


 


Output Total 1260 ml


 


Balance -490 ml











Laboratory Data


Labs 24H


Laboratory Tests 2


10/8/20 04:10: 


Immature Granulocyte % (Auto) 0.4, Neutrophils (%) (Auto) 81.6H, Lymphocytes (%)

 (Auto) 7.5L, Monocytes (%) (Auto) 10.5H, Eosinophils (%) (Auto) 0.0, Basophils 

(%) (Auto) 0.0, Neutrophils # (Auto) 6.5, Lymphocytes # (Auto) 0.6L, Monocytes #

 (Auto) 0.8, Eosinophils # (Auto) 0.0, Basophils # (Auto) 0.0, Nucleated Red 

Blood Cells % (auto) 0.0, Anion Gap 7L, Glomerular Filtration Rate > 60.0, 

Calcium Level 8.3L, Phosphorus Level 2.5, Magnesium Level 2.1, Total Bilirubin 

0.6, Aspartate Amino Transf (AST/SGOT) 34, Alanine Aminotransferase (ALT/SGPT) 

48, Alkaline Phosphatase 101, Total Protein 6.1L, Albumin 3.2, Albumin/Globulin 

Ratio 1.1


CBC/BMP


Laboratory Tests


10/8/20 04:10











Microbiology





Microbiology


10/5/20 Blood Culture - Preliminary, Resulted


          No Growth after 48 hours. All Specime...


10/5/20 Blood Culture - Preliminary, Resulted


          No Growth after 48 hours. All Specime...


10/5/20 Respiratory Virus Panel (PCR) ([facility]) - Final, Complete





Discharge Medications


Scheduled


Apixaban (Eliquis) 2.5 Mg Tab, 2.5 MG PO BID, (Reported)


Aspirin (Aspirin EC) 81 Mg Tab, 81 MG PO DAILY, (Reported)


Atorvastatin Calcium (Atorvastatin Calcium) 80 Mg Tab, 80 MG PO QHS, (Reported)


Carvedilol (Carvedilol) 3.125 Mg Tab, 3.125 MG PO BID, (Reported)


Clopidogrel Bisulfate (Clopidogrel) 75 Mg Tablet, 75 MG PO DAILY


Ezetimibe (Zetia) 10 Mg Tab, 10 MG PO DAILY, (Reported)


Furosemide (Furosemide) 20 Mg Tablet, 20 MG PO DAILY, (Reported)


   @ NOON 


Guaifenesin (Mucinex) 600 Mg Tab.er.12h, 1,200 MG PO BID


Ramipril (Ramipril) 2.5 Mg Cap, 2.5 MG PO DAILY, (Reported)


   USUALLY TAKES MIDDAY 





Scheduled PRN


Albuterol Sulfate (Ventolin Hfa) 18 Gm Hfa.aer.ad, 2 PUFFS INH Q4H PRN for 

SHORTNESS OF BREATH, (Reported)


Docusate Sodium (Colace) 100 Mg Capsule, 100 MG PO BID PRN for CONSTIPATION, 

(Reported)


Ipratropium/Albuterol Sulfate (Iprat-Albut 0.5-3(2.5) mg/3 ml) 3 Ml Ampul.neb, 3

 ML NEB Q8HP PRN for SOB/COUGH





Allergies


Coded Allergies:  


     No Known Allergies (Unverified , 9/6/20)











RIC MATT DO             Oct 8, 2020 14:00

## 2020-10-08 NOTE — ECGEPIP
[facility]

                                       

                                       Test Date:    2020-10-06

Pat Name:     ADELFO REYES           Department:   

Patient ID:   [id number]                 Room:         [place] [address]

Gender:       Male                     Technician:   STEFANIE

:          1935               Requested By: RIC MATT 

Order Number: [id number]     Reading MD:   Paulo Clayton

                                 Measurements

Intervals                              Axis          

Rate:         78                       P:            -41

NC:           200                      QRS:          29

QRSD:         114                      T:            43

QT:           441                                    

QTc:          505                                    

                           Interpretive Statements

SINUS RHYTHM

FIRST DGR AV BLOCK 

INFERIOR MYOCARDIAL INFARCTION, OLD 

SIMILAR TO 10/5/20

Electronically Signed on 10-8-2020 19:20:38 EDT by Paulo Clayton

## 2020-10-08 NOTE — ECGEPIP
[facility]

                                       

                                       Test Date:    2020-10-08

Pat Name:     ADELFO REYES           Department:   

Patient ID:   [id number]                 Room:         [place] [address]

Gender:       Male                     Technician:   

:          1935               Requested By: Paulo Clayton 

Order Number: [id number]     Reading MD:   Paulo Clayton

                                 Measurements

Intervals                              Axis          

Rate:         79                       P:            -84

FL:           140                      QRS:          47

QRSD:         121                      T:            -67

QT:           423                                    

QTc:          486                                    

                           Interpretive Statements

SINUS RHYTHM WITH OCCASIONAL VENTRICULAR PREMATURE COMPLEXES

RIGHT BUNDLE BRANCH BLOCK

INFERIOR MYOCARDIAL INFARCTION, OLD

NON-SPECIFIC STT ABNORMALITIES

SIMILAR TO 10/7/20

Electronically Signed on 10-8-2020 19:33:28 EDT by Paulo Clayton

## 2020-10-09 NOTE — IPN
DATE:  10/08/2020



Mr. Case had a relatively uneventful day and night yesterday.  He did some 
physical therapy which he says went well.  He still was walking with a walker 
but without major difficulty.  He denies any chest pain.  Shortness of breath is
at his baseline.  Telemetry monitoring continues to be relatively uneventful 
even though he has definite atrial fibrillation at this point and had also 
episodes of nonsustained ventricular tachycardia that were very brief, only one 
occurred last night and lasted approximately 2 seconds.  



PHYSICAL EXAMINATION:  Mr. Case is a pleasant, 85-year-old man, he appears 
younger than his age.  His last set of vital signs:  Blood pressure 112/58, 
heart rate has been in mostly 70s and 80s, he is afebrile, saturation is 95% on 
room air.  Jugular venous pressure (JVP) looks still a little elevated.  Lungs 
are clear on the right, there is no sounds on the left.  Heart exam reveals 
irregularly irregular rhythm without obvious gallop or rub, there is a murmur at
the apex.  Abdomen is soft.  Trivial peripheral edema.  Neurologically he is 
intact.  



His ECG does not reveal any evolution on QRS complex and nonspecific 
repolarization abnormalities remain essentially unchanged.  He is in atrial 
fibrillation unequivocally at this point.



An echocardiogram that was interpreted by myself yesterday was a somewhat 
limited study.  The ejection fraction overall was estimated around 40-45%.  He 
has pretty heavy aortic valve calcifications but by Doppler imaging there was 
only mild aortic stenosis even though by visualization I would assume that the 
degree of severity of aortic stenosis is worse than that.  About mild to 
moderate mitral and tricuspid insufficiency and moderate pulmonary hypertension.



At this point, I believe Mr. Case can be discharged home.  I would discharge 
him on his chronic medications plus Plavix.  I spoke with his cardiologist from 
[place] yesterday.  He will see him in followup on Monday or Tuesday.  I 
talked to the patient and explained to him that my recommendation still would be
to undergo catheterization because the likelihood of new ischemic component is 
relatively high but ultimately the decision will be made by his cardiologist.  
If, by any chance, he has some new problems before he makes it to [place] 
he needs to go back to the hospital.

MODESTO

## 2020-10-09 NOTE — IPN
DATE:  10/07/2020



Mr. Case had a relatively uneventful remainder of yesterday.  He did not have
any significant arrhythmias other than very a brief run of nonsustained 
ventricular tachycardia (VT).  He did not have any chest discomfort and he 
remained off bilevel positive airway pressure (BIPAP) all day and feels that his
dyspnea is at his baseline.  



Vital signs:  Blood pressure this morning was 82/52, throughout the night was 
mostly 90s.  He is afebrile, saturation 95% on room air.  His fluid balance 
yesterday was recorded at about -500, weight was recorded as 59 kg this morning.
 He is alert and oriented and appropriate.  His jugular venous pressure (JVP) 
still appears elevated about maybe 4 cm above clavicle in upright position.  
Lungs are clear on the right, on the left diminished after pneumonectomy.  Heart
exam reveals regular rhythm, I do not appreciate any gallops, there is a murmur 
at the apex maybe 2/6 intensity, blowing in nature, indicative of mitral 
regurgitation (MR).  Abdomen is soft, nontender.  Extremities have only minimal 
edema today.  Neurologically he is intact.



LABORATORIES:  Basic metabolic panel is normal with the exception of BUN of 36 
and creatinine 1.38 for a GFR of 52.  His magnesium is 1.8.  His troponin I 
peaked at 1.6 yesterday and the followup went down.  The CK MB maximum was 5.7. 




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 
myocardial infarction and T wave inversion in V2 that was not present yesterday,
but otherwise no convincing ST-T abnormalities.



ASSESSMENT AND PLAN:  Mr. Case is an 85-year-old man who has a history of 
left pneumonectomy for lung cancer many years ago and also has ischemic 
cardiomyopathy with history of bypass surgery and subsequent coronary stenting 
also years ago.  He has baseline left ventricular systolic dysfunction, we are 
trying to obtain his records from [place].  He suffered a non-ST elevation 
myocardial infarction with trivial troponin elevation, no convincing evolution 
on EKG, and never had any chest discomfort.  At this point, I had a discussion 
with him again.  I explained that under normal circumstances our approach would 
be to transfer him for coronary angiography with the idea to establish his 
coronary anatomy.  I still would need to have records from his cardiologist.  
But I had a discussion with the patient, he really does not want to pursue this 
route and if anything needs to be done he would like to have it done in [place].  Apparently, his daughter is coming this Friday and the plan is to drive
him back to [place] this Saturday.  Provided his clinical course remains 
relatively uneventful, I think this is acceptable.  We will continue aspirin, 
Plavix, and Lovenox for at least one more day and will start to have him 
ambulate.  Otherwise, the remaining supportive therapy will be continued.  His 
angiotensin-converting enzyme (ACE) inhibitors and diuretics will be held.  He 
does not appear volume overloaded and the blood pressure is very soft, but once 
it gets better we will obviously put him back on his chronic ACE inhibitor.

MODESTO

## 2020-10-10 NOTE — ECGEPIP
[facility]

                                       

                                       Test Date:    2020-10-08

Pat Name:     ADELFO REYES           Department:   

Patient ID:   [id number]                 Room:         [place] [address]

Gender:       Male                     Technician:   YULIA

:          1935               Requested By: CAMERON HART 

Order Number: [id number]     Reading MD:   Drew Reed

                                 Measurements

Intervals                              Axis          

Rate:         79                       P:            

CO:           0                        QRS:          1

QRSD:         129                      T:            108

QT:           414                                    

QTc:          476                                    

                           Interpretive Statements

POSSIBLE ATRIAL FIBRILLATION

RSR IN V1/V2

LATERAL MYOCARDIAL INFARCTION, OF INDETERMINATE AGE

Compared to prior tracings in the system, Atrial Fib is not new

Electronically Signed on 10- 21:07:57 EDT by Drew Reed